# Patient Record
Sex: FEMALE | Race: WHITE | NOT HISPANIC OR LATINO | Employment: OTHER | URBAN - METROPOLITAN AREA
[De-identification: names, ages, dates, MRNs, and addresses within clinical notes are randomized per-mention and may not be internally consistent; named-entity substitution may affect disease eponyms.]

---

## 2017-02-10 ENCOUNTER — TELEPHONE (OUTPATIENT)
Dept: FAMILY MEDICINE | Facility: CLINIC | Age: 72
End: 2017-02-10

## 2017-02-10 NOTE — TELEPHONE ENCOUNTER
----- Message from Babita Poe sent at 2/10/2017 12:08 PM CST -----  Contact: self  Patient called regarding refill of medication. Please contact [phone] (home)     pravastatin 40 MG tablet  losartan 50 MG tablet    [facility] - [place], [place] - [address]  [address] [address]  Phone: [phone] Fax: [phone]

## 2017-02-11 RX ORDER — PRAVASTATIN SODIUM 40 MG/1
TABLET ORAL
Qty: 90 TABLET | Refills: 0 | Status: SHIPPED | OUTPATIENT
Start: 2017-02-11 | End: 2017-05-10 | Stop reason: SDUPTHER

## 2017-02-11 RX ORDER — LOSARTAN POTASSIUM 50 MG/1
50 TABLET ORAL DAILY
Qty: 90 TABLET | Refills: 0 | Status: SHIPPED | OUTPATIENT
Start: 2017-02-11 | End: 2017-04-11 | Stop reason: SDUPTHER

## 2017-04-14 RX ORDER — LOSARTAN POTASSIUM 50 MG/1
TABLET ORAL
Qty: 90 TABLET | Refills: 0 | Status: SHIPPED | OUTPATIENT
Start: 2017-04-14 | End: 2017-04-18 | Stop reason: SDUPTHER

## 2017-04-19 NOTE — TELEPHONE ENCOUNTER
----- Message from Cady Jarvis sent at 4/18/2017  4:50 PM CDT -----  Contact: Patient  Honey, patient [phone], Patient is calling about the Rx Lorsartan 50 mg. Please call into Pharmacy. Patient is schedule to visit office on 7/10/17.    [facility] - [place], [place] - [address]  [address] [address]  Phone: [phone] Fax: [phone]    Please advise. Thanks.

## 2017-04-21 RX ORDER — LOSARTAN POTASSIUM 50 MG/1
TABLET ORAL
Qty: 90 TABLET | Refills: 0 | Status: SHIPPED | OUTPATIENT
Start: 2017-04-21 | End: 2017-07-10 | Stop reason: SDUPTHER

## 2017-05-11 RX ORDER — PRAVASTATIN SODIUM 40 MG/1
TABLET ORAL
Qty: 90 TABLET | Refills: 0 | Status: SHIPPED | OUTPATIENT
Start: 2017-05-11 | End: 2017-07-10

## 2017-06-08 DIAGNOSIS — M25.569 KNEE PAIN, UNSPECIFIED CHRONICITY, UNSPECIFIED LATERALITY: Primary | ICD-10-CM

## 2017-06-13 ENCOUNTER — HOSPITAL ENCOUNTER (OUTPATIENT)
Dept: RADIOLOGY | Facility: HOSPITAL | Age: 72
Discharge: HOME | End: 2017-06-13
Attending: ORTHOPAEDIC SURGERY
Payer: MEDICARE

## 2017-06-13 ENCOUNTER — OFFICE VISIT (OUTPATIENT)
Dept: ORTHOPEDICS | Facility: CLINIC | Age: 72
End: 2017-06-13
Payer: MEDICARE

## 2017-06-13 VITALS
SYSTOLIC BLOOD PRESSURE: 116 MMHG | DIASTOLIC BLOOD PRESSURE: 76 MMHG | HEART RATE: 87 BPM | WEIGHT: 121.94 LBS | HEIGHT: 63 IN | BODY MASS INDEX: 21.61 KG/M2

## 2017-06-13 DIAGNOSIS — M21.161 GENU VARUM OF BOTH LOWER EXTREMITIES: ICD-10-CM

## 2017-06-13 DIAGNOSIS — M17.0 PRIMARY OSTEOARTHRITIS OF BOTH KNEES: Primary | ICD-10-CM

## 2017-06-13 DIAGNOSIS — M25.562 CHRONIC PAIN OF BOTH KNEES: ICD-10-CM

## 2017-06-13 DIAGNOSIS — M21.162 GENU VARUM OF BOTH LOWER EXTREMITIES: ICD-10-CM

## 2017-06-13 DIAGNOSIS — M25.569 KNEE PAIN, UNSPECIFIED CHRONICITY, UNSPECIFIED LATERALITY: ICD-10-CM

## 2017-06-13 DIAGNOSIS — G89.29 CHRONIC PAIN OF BOTH KNEES: ICD-10-CM

## 2017-06-13 DIAGNOSIS — M25.561 CHRONIC PAIN OF BOTH KNEES: ICD-10-CM

## 2017-06-13 PROCEDURE — 1125F AMNT PAIN NOTED PAIN PRSNT: CPT | Mod: S$GLB,,, | Performed by: ORTHOPAEDIC SURGERY

## 2017-06-13 PROCEDURE — 99213 OFFICE O/P EST LOW 20 MIN: CPT | Mod: 25,S$GLB,, | Performed by: ORTHOPAEDIC SURGERY

## 2017-06-13 PROCEDURE — 99999 PR PBB SHADOW E&M-EST. PATIENT-LVL II: CPT | Mod: PBBFAC,,, | Performed by: ORTHOPAEDIC SURGERY

## 2017-06-13 PROCEDURE — 73564 X-RAY EXAM KNEE 4 OR MORE: CPT | Mod: TC,50,PO

## 2017-06-13 PROCEDURE — 73564 X-RAY EXAM KNEE 4 OR MORE: CPT | Mod: 26,50,, | Performed by: RADIOLOGY

## 2017-06-13 PROCEDURE — 97760 ORTHOTIC MGMT&TRAING 1ST ENC: CPT | Mod: 51,GP,S$GLB, | Performed by: ORTHOPAEDIC SURGERY

## 2017-06-13 PROCEDURE — 20610 DRAIN/INJ JOINT/BURSA W/O US: CPT | Mod: 50,S$GLB,, | Performed by: ORTHOPAEDIC SURGERY

## 2017-06-13 PROCEDURE — 1159F MED LIST DOCD IN RCRD: CPT | Mod: S$GLB,,, | Performed by: ORTHOPAEDIC SURGERY

## 2017-06-13 RX ORDER — TRIAMCINOLONE ACETONIDE 40 MG/ML
80 INJECTION, SUSPENSION INTRA-ARTICULAR; INTRAMUSCULAR
Status: DISCONTINUED | OUTPATIENT
Start: 2017-06-13 | End: 2017-06-13 | Stop reason: HOSPADM

## 2017-06-13 RX ADMIN — TRIAMCINOLONE ACETONIDE 80 MG: 40 INJECTION, SUSPENSION INTRA-ARTICULAR; INTRAMUSCULAR at 08:06

## 2017-06-13 NOTE — PROGRESS NOTES
CC:bilateral KNEE pain    HISTORY OF PRESENT ILLNESS:  Honey Alejandre is a 71 y.o. left hand dominant Female who reports bilateral (left > right) knee pain refractory to conservative mgmt. She did have injections in December 2016.    History isnegative fortrauma.    Today the patient rates pain at a 7/10 on visual analog scale.      The patient had tried diclofenac once a day; injections (left knee steroid/viscosupplements) to make the pain better with some relief of the pain.    She reports that the pain is worse with standing; walking; running.  It does currently bother the patient at night.    negative mechanical symptoms, negative forinstability; +/- swelling on the left primarily    It does currently affect the performance of ADLs. It does currently affect the ability to perform exercises or recreational activities which include: walking      Past Medical History:   Diagnosis Date    CAD (coronary artery disease)     lad, 2008    Cataract     OU    Colon polyps     Foot fracture     bilateral    Hyperlipidemia     Hypertension     Hyperthyroidism     in past in childhood    Lateral meniscal tear     Seasonal allergies        Past Surgical History:   Procedure Laterality Date    CAROTID STENT      COLONOSCOPY W/ POLYPECTOMY  8/28/2007  [facility]    3 mm transverse colon polyp.  ADENOMATOUS POLYP.  Diminutive sigmoid polyp and rectal polyp, both SERRATED POLYPS WITH HYPERPLASTIC FEATURES.  Extensive sigmoid and descending diverticulosis.  Internal hemorrhoids.    DILATION AND CURETTAGE OF UTERUS      EYE SURGERY      left, after trauma (facial)    FLEXIBLE SIGMOIDOSCOPY  1987  [facility]    HERNIA REPAIR      right inguinal    s/p orbital fracture repair --OS      TONSILLECTOMY      and adenoids    UMBILICAL HERNIA REPAIR         Family History   Problem Relation Age of Onset    Colon cancer Mother     Thyroid disease Mother     Cancer Mother      colon    Stroke Father     Coronary artery  disease Father      1st MI at 52 years old    Lung cancer Sister      oat cell    Breast cancer Sister     Arthritis Brother     Cancer Sister      lung cancer (Oat Cell carcinoma)    Breast cancer       pat GM, sister, cousin         Current Outpatient Prescriptions:     azelastine (ASTELIN) 137 mcg nasal spray, 1 spray (137 mcg total) by Nasal route 2 (two) times daily. (Patient taking differently: 1 spray by Nasal route 2 (two) times daily as needed. ), Disp: 30 mL, Rfl: 1    cholecalciferol, vitamin D3, (VITAMIN D) 1,000 unit capsule, Take 1,000 Units by mouth once daily. Every day, Disp: , Rfl:     cyanocobalamin (VITAMIN B-12) 1000 MCG tablet, Every morning, Disp: , Rfl:     diclofenac (VOLTAREN) 75 MG EC tablet, TAKE 1 TABLET TWICE DAILY, Disp: 180 tablet, Rfl: 3    krill oil-omega-3-dha-epa 300-90 (27-45) mg Cap, Every morning, Disp: , Rfl:     losartan (COZAAR) 50 MG tablet, TAKE 1 TABLET ONE TIME DAILY, Disp: 90 tablet, Rfl: 0    multivitamin-minerals-lutein (SPECTRAVITE SENIOR W-LYCOPENE) Tab, Every morning, Disp: , Rfl:     pravastatin (PRAVACHOL) 40 MG tablet, TAKE 1 TABLET EVERY DAY, Disp: 90 tablet, Rfl: 0    Current Facility-Administered Medications:     triamcinolone acetonide injection 40 mg, 40 mg, Intramuscular, 1 time in Clinic/HOD, Dillon Eckert Jr., MD    Allergies   Allergen Reactions    Morphine      Other reaction(s): Nausea       Review of Systems   Constitutional: Positive for weight loss.   HENT: Positive for congestion and hearing loss.    Eyes:        Change in vision; left ey stroke   Cardiovascular:        HTN   Gastrointestinal: Positive for diarrhea.   Musculoskeletal: Positive for joint pain and myalgias.        Stiffness   Skin: Positive for rash.   Neurological: Positive for headaches.   Endo/Heme/Allergies:        Cholesterol problem   All other systems reviewed and are negative.      OBJECTIVE:  Vitals:    06/13/17 0813   BP: 116/76   Pulse: 87       PHYSICAL  EXAMINATION    General:  The patient is alert and oriented x 3.  Mood is pleasant.  Observation of ears, eyes and nose reveal no gross abnormalities.  No labored breathing observed.    Bilateral KNEE EXAMINATION     OBSERVATION / INSPECTION   Gait:   Antalgic   Alignment:  Varus bilaterally  Scars:   None   Muscle atrophy: Mild  Effusion:  Mild bilaterally   Warmth:  None   Discoloration:   none     TENDERNESS / CREPITUS (T / C):          T / C      T / C   Patella   ++ / -   Lateral joint line   - / -    Peripatellar medial  +  Medial joint line    +++ / -    Peripatellar lateral +  Medial plica   - / -    Patellar tendon -   Popliteal fossa  - / -    Quad tendon   -   Gastrocnemius   -   Prepatellar Bursa - / -   Quadricep   -   Tibial tubercle  -  Thigh/hamstring  -   Pes anserine/HS -  Fibula    -   ITB   - / -  Tibia     -   Tib/fib joint  - / -  LCL    -     MFC   - / -   MCL: Proximal  -    LFC   - / -    Distal   -          ROM: (* = pain)  PASSIVE   ACTIVE    Left :   5 / 130    5  / 130  *   Right :    0 / 140    0 / 140  *    Patellofemoral examination:  See above noted areas of tenderness.   Patella position    Subluxation / dislocation: Centered           Sup. / Inf;   Normal   Crepitus (PF):    ++ bilaterally  Patellar Mobility:       Medial-lateral:   Normal    Superior-inferior:  Normal    Inferior tilt   Normal    Patellar tendon:  Normal   Lateral tilt:    Normal   J-sign:     None   Patellofemoral grind:   +       MENISCAL SIGNS:     Pain on terminal extension:  -  Pain on terminal flexion:  +  Renes maneuver:  - for pain      LIGAMENT EXAMINATION:  ACL / Lachman:  normal (-1 to 2mm)    PCL-Post.  drawer: normal 0 to 2mm  MCL- Valgus:  normal 0 to 2mm  LCL- Varus:  normal 0 to 2mm    STRENGTH: (* = with pain) PAINFUL SIDE   Quadricep   5/5   Hamstrin/5    EXTREMITY NEURO-VASCULAR EXAMINATION:   Sensation:  Grossly intact to light touch all dermatomal regions.   Motor Function:   Fully intact motor function at hip, knee, foot and ankle    Vascular status:  DP and PT pulses 2+, brisk capillary refill, symmetric.       Xrays: Reviewed personally by me (standing AP/flexion, lateral, sunrise) show:   Findings:     The bones are osteopenic.  There is tricompartmental osteophyte formation of both knees.  There is severe bilateral medial tibiofemoral joint space narrowing resulting in varus angulation of both knees.  No acute fracture or osseous destructive process.  No chondrocalcinosis.   Impression    Degenerative change of the knees, most prominently affecting the medial tibiofemoral joint compartments.            ASSESSMENT:    Bilateral Knee Osteoarthritis with exacerbations    PLAN:   The patient was counseled today regarding her diagnostic study results and the different treatment options. I spent >50% of the time discussing conservative vs. Operative options with the patient as well as risks and benefits of the different options.  Bilateral cortisone injections  [id number] - Martha Gold and I, performed a custom orthotic / brace adjustment, fitting and training with the patient. The patient demonstrated understanding and proper care. This was performed for 15 minutes.  Knee sleeve  F/U PRN  All questions were answered, pt will contact us for questions or concerns in the interim.

## 2017-06-13 NOTE — PROCEDURES
Large Joint Aspiration/Injection  Date/Time: 6/13/2017 8:42 AM  Performed by: NI COLÓN  Authorized by: NI COLÓN     Consent Done?:  Yes (Verbal)  Indications:  Pain  Procedure site marked: Yes    Timeout: Prior to procedure the correct patient, procedure, and site was verified      Location:  Knee  Site:  R knee and L knee  Prep: Patient was prepped and draped in usual sterile fashion    Ultrasonic Guidance for needle placement: No  Needle size:  22 G  Approach:  Anterolateral  Medications:  80 mg triamcinolone acetonide 40 mg/mL; 80 mg triamcinolone acetonide 40 mg/mL  Patient tolerance:  Patient tolerated the procedure well with no immediate complications

## 2017-07-09 NOTE — PROGRESS NOTES
HISTORY OF PRESENT ILLNESS:  PtJose is a 71 y.o. female presents for monitoring of her hypertension, hyperlipidemia, coronary artery disease, anemia, OA of the knees.  She is UTD with Tdap 7/19/16, Prevnar 13, colonoscopy done 7/24/13.  She is due for mammogram, BMD, pneumovax.  She recently saw Dr. Kumar for her OA of knees and had joint injections.  She had labs done 6 months ago that were WNL.  Has been having increased bruising.    Lab Results   Component Value Date    WBC 4.65 12/13/2016    HGB 13.4 12/13/2016    HCT 41.1 12/13/2016     12/13/2016    CHOL 212 (H) 12/13/2016    TRIG 107 12/13/2016    HDL 53 12/13/2016    ALT 18 12/13/2016    AST 15 12/13/2016     12/13/2016    K 4.3 12/13/2016     12/13/2016    CREATININE 0.8 12/13/2016    BUN 25 (H) 12/13/2016    CO2 26 12/13/2016    TSH 1.491 12/13/2016    HGBA1C 5.8 07/27/2012     Lab Results   Component Value Date    LDLCALC 137.6 12/13/2016           ROS:  GENERAL: No fever, chills, fatigability or weight loss.  SKIN: No rashes, itching or changes in color or texture of skin; positive bruising;  HEAD: No headaches or recent head trauma.  EARS: Denies ear pain, discharge or vertigo; slight dizziness if turning head quickly;  NOSE: No loss of smell, no epistaxis or postnasal drip.  MOUTH & THROAT: No hoarseness or change in voice. No excessive gum bleeding.  NODES: Denies swollen glands.  CHEST: Denies BRUNO, cyanosis, wheezing, cough and sputum production.  CARDIOVASCULAR: Denies chest pain, PND, orthopnea or reduced exercise tolerance.  ABDOMEN: Appetite fine. No weight loss. Denies constipation, diarrhea, abdominal pain, hematemesis or blood in stool.  URINARY: No flank pain, dysuria or hematuria.  PERIPHERAL VASCULAR: No claudication or cyanosis. No edema.  MUSCULOSKELETAL: No joint stiffness or swelling. Denies back pain.  NEUROLOGIC: Denies numbness    PE:   Vitals:   Vitals:    07/10/17 1455   BP: 130/78   Pulse: 81     GENERAL: no  acute distress, A&Ox3, comfortable.  Female with BMI of 21   HEENT: tympanic membranes clear, nasal mucosa pink, no pharyngeal erythema or exudate  NECK: supple, no cervical lymphadenopathy, no thyromegaly; no supraclavicular nodes;   CHEST:  clear to auscultation bilaterally, no crackles or wheeze; no increased work of breathing;  CARDIOVASCULAR: regular rate and rhythm, no rubs, murmurs or gallops.  ABDOMEN: normal bowel sounds, soft non-tender, non-distended; no palpable organomegaly;   EXT: no clubbing, cyanosis or edema.     ASSESSMENT/PLAN:    Essential hypertension  -     Comprehensive metabolic panel; Future; Expected date: 07/10/2017    Bruising  -     CBC auto differential; Future; Expected date: 07/10/2017    Hyperlipidemia, unspecified hyperlipidemia type  -     Lipid panel; Future; Expected date: 12/18/2017    Health care maintenance  -     Mammo Digital Screening Bilat with CAD; Future; Expected date: 07/10/2017  -     DXA Bone Density Spine And Hip; Future; Expected date: 07/10/2017    Encounter for screening mammogram for malignant neoplasm of breast   -     Mammo Digital Screening Bilat with CAD; Future; Expected date: 07/10/2017    Asymptomatic menopausal state   -     DXA Bone Density Spine And Hip; Future; Expected date: 07/10/2017    Other orders  -     (In Office Administered) Pneumococcal Polysaccharide Vaccine (23 Valent) (SQ/IM)  -     pravastatin (PRAVACHOL) 40 MG tablet; Take 1 tablet (40 mg total) by mouth once daily.  Dispense: 90 tablet; Refill: 0  -     losartan (COZAAR) 50 MG tablet; TAKE 1 TABLET ONE TIME DAILY  Dispense: 90 tablet; Refill: 0      Call if condition changes or worsens.

## 2017-07-10 ENCOUNTER — OFFICE VISIT (OUTPATIENT)
Dept: INTERNAL MEDICINE | Facility: CLINIC | Age: 72
End: 2017-07-10
Payer: MEDICARE

## 2017-07-10 VITALS
OXYGEN SATURATION: 98 % | HEIGHT: 63 IN | SYSTOLIC BLOOD PRESSURE: 130 MMHG | BODY MASS INDEX: 21.05 KG/M2 | DIASTOLIC BLOOD PRESSURE: 78 MMHG | WEIGHT: 118.81 LBS | HEART RATE: 81 BPM

## 2017-07-10 DIAGNOSIS — E78.5 HYPERLIPIDEMIA, UNSPECIFIED HYPERLIPIDEMIA TYPE: ICD-10-CM

## 2017-07-10 DIAGNOSIS — I10 ESSENTIAL HYPERTENSION: ICD-10-CM

## 2017-07-10 DIAGNOSIS — Z00.00 HEALTH CARE MAINTENANCE: ICD-10-CM

## 2017-07-10 DIAGNOSIS — Z78.0 ASYMPTOMATIC MENOPAUSAL STATE: ICD-10-CM

## 2017-07-10 DIAGNOSIS — Z12.31 ENCOUNTER FOR SCREENING MAMMOGRAM FOR MALIGNANT NEOPLASM OF BREAST: ICD-10-CM

## 2017-07-10 DIAGNOSIS — T14.8XXA BRUISING: ICD-10-CM

## 2017-07-10 PROCEDURE — 1125F AMNT PAIN NOTED PAIN PRSNT: CPT | Mod: S$GLB,,, | Performed by: INTERNAL MEDICINE

## 2017-07-10 PROCEDURE — 99999 PR PBB SHADOW E&M-EST. PATIENT-LVL III: CPT | Mod: PBBFAC,,, | Performed by: INTERNAL MEDICINE

## 2017-07-10 PROCEDURE — G0009 ADMIN PNEUMOCOCCAL VACCINE: HCPCS | Mod: S$GLB,,, | Performed by: INTERNAL MEDICINE

## 2017-07-10 PROCEDURE — 90732 PPSV23 VACC 2 YRS+ SUBQ/IM: CPT | Mod: S$GLB,,, | Performed by: INTERNAL MEDICINE

## 2017-07-10 PROCEDURE — 1159F MED LIST DOCD IN RCRD: CPT | Mod: S$GLB,,, | Performed by: INTERNAL MEDICINE

## 2017-07-10 PROCEDURE — 99214 OFFICE O/P EST MOD 30 MIN: CPT | Mod: S$GLB,,, | Performed by: INTERNAL MEDICINE

## 2017-07-10 RX ORDER — LOSARTAN POTASSIUM 50 MG/1
TABLET ORAL
Qty: 90 TABLET | Refills: 0 | Status: SHIPPED | OUTPATIENT
Start: 2017-07-10 | End: 2017-10-13 | Stop reason: SDUPTHER

## 2017-07-10 RX ORDER — B-COMPLEX WITH VITAMIN C
1 TABLET ORAL DAILY
COMMUNITY
End: 2018-04-30

## 2017-07-10 RX ORDER — GLUCOSAMINE/CHONDRO SU A 500-400 MG
1 TABLET ORAL 3 TIMES DAILY
COMMUNITY
End: 2017-11-07

## 2017-07-10 RX ORDER — PRAVASTATIN SODIUM 40 MG/1
40 TABLET ORAL DAILY
Qty: 90 TABLET | Refills: 0
Start: 2017-07-10 | End: 2017-07-19 | Stop reason: SDUPTHER

## 2017-07-11 ENCOUNTER — TELEPHONE (OUTPATIENT)
Dept: ORTHOPEDICS | Facility: CLINIC | Age: 72
End: 2017-07-11

## 2017-07-11 NOTE — TELEPHONE ENCOUNTER
----- Message from Betito Kumar MD sent at 7/11/2017 12:58 PM CDT -----      ----- Message -----  From: Mickie Lynch  Sent: 7/11/2017  11:01 AM  To: Betito Kumar MD    Good Morning,     I received a call from Mrs. DHRUV Maldonado requesting the total cost estimate for knee replacement surgery. I am happy to assist but am needing the CPT Code of this surgery and also need to know this is done as Inpatient or out patient. Can you please provide this information so that I may complete the pt's request? I appreciate any help you can give.    Thank you,     Mickie

## 2017-07-12 PROBLEM — T14.8XXA BRUISING: Status: ACTIVE | Noted: 2017-07-12

## 2017-07-18 ENCOUNTER — HOSPITAL ENCOUNTER (OUTPATIENT)
Dept: RADIOLOGY | Facility: HOSPITAL | Age: 72
Discharge: HOME | End: 2017-07-18
Attending: INTERNAL MEDICINE
Payer: MEDICARE

## 2017-07-18 DIAGNOSIS — Z12.31 ENCOUNTER FOR SCREENING MAMMOGRAM FOR MALIGNANT NEOPLASM OF BREAST: ICD-10-CM

## 2017-07-18 DIAGNOSIS — Z00.00 HEALTH CARE MAINTENANCE: ICD-10-CM

## 2017-07-18 PROCEDURE — 77067 SCR MAMMO BI INCL CAD: CPT | Mod: 26,,, | Performed by: RADIOLOGY

## 2017-07-18 PROCEDURE — 77067 SCR MAMMO BI INCL CAD: CPT | Mod: TC

## 2017-07-18 PROCEDURE — 77063 BREAST TOMOSYNTHESIS BI: CPT | Mod: 26,,, | Performed by: RADIOLOGY

## 2017-07-19 ENCOUNTER — HOSPITAL ENCOUNTER (OUTPATIENT)
Dept: RADIOLOGY | Facility: HOSPITAL | Age: 72
Discharge: HOME | End: 2017-07-19
Attending: INTERNAL MEDICINE
Payer: MEDICARE

## 2017-07-19 DIAGNOSIS — Z78.0 ASYMPTOMATIC MENOPAUSAL STATE: ICD-10-CM

## 2017-07-19 DIAGNOSIS — Z00.00 HEALTH CARE MAINTENANCE: ICD-10-CM

## 2017-07-19 PROCEDURE — 77080 DXA BONE DENSITY AXIAL: CPT | Mod: TC,PO

## 2017-07-19 PROCEDURE — 77080 DXA BONE DENSITY AXIAL: CPT | Mod: 26,,, | Performed by: RADIOLOGY

## 2017-07-19 RX ORDER — PRAVASTATIN SODIUM 40 MG/1
TABLET ORAL
Qty: 90 TABLET | Refills: 0 | Status: SHIPPED | OUTPATIENT
Start: 2017-07-19 | End: 2017-09-22 | Stop reason: SDUPTHER

## 2017-07-25 ENCOUNTER — OFFICE VISIT (OUTPATIENT)
Dept: ORTHOPEDICS | Facility: CLINIC | Age: 72
End: 2017-07-25
Payer: MEDICARE

## 2017-07-25 ENCOUNTER — CLINICAL SUPPORT (OUTPATIENT)
Dept: ORTHOPEDICS | Facility: CLINIC | Age: 72
End: 2017-07-25
Payer: MEDICARE

## 2017-07-25 VITALS
HEART RATE: 75 BPM | DIASTOLIC BLOOD PRESSURE: 80 MMHG | WEIGHT: 118.81 LBS | BODY MASS INDEX: 21.05 KG/M2 | SYSTOLIC BLOOD PRESSURE: 159 MMHG | HEIGHT: 63 IN

## 2017-07-25 DIAGNOSIS — M21.162 GENU VARUM OF BOTH LOWER EXTREMITIES: ICD-10-CM

## 2017-07-25 DIAGNOSIS — M25.562 CHRONIC PAIN OF BOTH KNEES: ICD-10-CM

## 2017-07-25 DIAGNOSIS — M21.161 GENU VARUM OF BOTH LOWER EXTREMITIES: ICD-10-CM

## 2017-07-25 DIAGNOSIS — G89.18 POST-OP PAIN: ICD-10-CM

## 2017-07-25 DIAGNOSIS — Z96.652 S/P TOTAL KNEE ARTHROPLASTY, LEFT: Primary | ICD-10-CM

## 2017-07-25 DIAGNOSIS — M25.561 CHRONIC PAIN OF BOTH KNEES: ICD-10-CM

## 2017-07-25 DIAGNOSIS — G89.29 CHRONIC PAIN OF BOTH KNEES: ICD-10-CM

## 2017-07-25 DIAGNOSIS — M17.0 PRIMARY OSTEOARTHRITIS OF BOTH KNEES: ICD-10-CM

## 2017-07-25 PROCEDURE — 99214 OFFICE O/P EST MOD 30 MIN: CPT | Mod: 57,S$GLB,, | Performed by: ORTHOPAEDIC SURGERY

## 2017-07-25 PROCEDURE — 99999 PR PBB SHADOW E&M-EST. PATIENT-LVL IV: CPT | Mod: PBBFAC,,, | Performed by: ORTHOPAEDIC SURGERY

## 2017-07-25 PROCEDURE — 1126F AMNT PAIN NOTED NONE PRSNT: CPT | Mod: S$GLB,,, | Performed by: ORTHOPAEDIC SURGERY

## 2017-07-25 PROCEDURE — 1159F MED LIST DOCD IN RCRD: CPT | Mod: S$GLB,,, | Performed by: ORTHOPAEDIC SURGERY

## 2017-07-25 RX ORDER — PROMETHAZINE HYDROCHLORIDE 25 MG/1
25 TABLET ORAL EVERY 6 HOURS PRN
Qty: 20 TABLET | Refills: 0 | Status: SHIPPED | OUTPATIENT
Start: 2017-07-25 | End: 2017-11-07

## 2017-07-25 RX ORDER — OXYCODONE AND ACETAMINOPHEN 5; 325 MG/1; MG/1
1 TABLET ORAL EVERY 8 HOURS PRN
Qty: 90 TABLET | Refills: 0 | Status: ON HOLD | OUTPATIENT
Start: 2017-07-25 | End: 2017-09-15 | Stop reason: HOSPADM

## 2017-07-25 RX ORDER — DOCUSATE SODIUM 100 MG/1
100 CAPSULE, LIQUID FILLED ORAL 2 TIMES DAILY
Qty: 60 CAPSULE | Refills: 0 | Status: SHIPPED | OUTPATIENT
Start: 2017-07-25 | End: 2017-11-07

## 2017-07-25 RX ORDER — SODIUM CHLORIDE 9 MG/ML
INJECTION, SOLUTION INTRAVENOUS CONTINUOUS
Status: CANCELLED | OUTPATIENT
Start: 2017-07-25

## 2017-07-25 NOTE — PROGRESS NOTES
CC:bilateral KNEE pain    HISTORY OF PRESENT ILLNESS:  Honey Alejandre is a 71 y.o. left hand dominant Female who reports bilateral (left > right) knee pain refractory to conservative mgmt. She did have injections in December 2016. She and her family are here to discuss total knee replacement    History isnegative fortrauma.    Today the patient rates pain at a 0-7/10 on visual analog scale.      The patient had tried diclofenac once a day; injections (left knee steroid/viscosupplements) to make the pain better with some relief of the pain.    She reports that the pain is worse with standing; walking; running.  It does currently bother the patient at night.    negative mechanical symptoms, negative forinstability; +/- swelling on the left primarily    It does currently affect the performance of ADLs. It does currently affect the ability to perform exercises or recreational activities which include: walking      Past Medical History:   Diagnosis Date    Arthritis     CAD (coronary artery disease)     lad, 2008    Cataract     OU    Colon polyps     Foot fracture     bilateral    Hyperlipidemia     Hypertension     Hyperthyroidism     in past in childhood    Lateral meniscal tear     Seasonal allergies        Past Surgical History:   Procedure Laterality Date    CAROTID STENT      COLONOSCOPY W/ POLYPECTOMY  8/28/2007  [facility]    3 mm transverse colon polyp.  ADENOMATOUS POLYP.  Diminutive sigmoid polyp and rectal polyp, both SERRATED POLYPS WITH HYPERPLASTIC FEATURES.  Extensive sigmoid and descending diverticulosis.  Internal hemorrhoids.    DILATION AND CURETTAGE OF UTERUS      EYE SURGERY      left, after trauma (facial)    FLEXIBLE SIGMOIDOSCOPY  1987  [facility]    HERNIA REPAIR      right inguinal    s/p orbital fracture repair --OS      TONSILLECTOMY      and adenoids    UMBILICAL HERNIA REPAIR         Family History   Problem Relation Age of Onset    Colon cancer Mother     Thyroid disease  Mother     Cancer Mother      colon    Stroke Father     Coronary artery disease Father      1st MI at 52 years old    Lung cancer Sister      oat cell    Breast cancer Sister     Arthritis Brother     Cancer Sister      lung cancer (Oat Cell carcinoma)    Breast cancer       pat GM, sister, cousin         Current Outpatient Prescriptions:     B-complex with vitamin C (Z-BEC OR EQUIV) tablet, Take 1 tablet by mouth once daily., Disp: , Rfl:     cholecalciferol, vitamin D3, (VITAMIN D) 1,000 unit capsule, Take 1,000 Units by mouth once daily. Every day, Disp: , Rfl:     diclofenac (VOLTAREN) 75 MG EC tablet, TAKE 1 TABLET TWICE DAILY, Disp: 180 tablet, Rfl: 3    glucosamine-chondroitin 500-400 mg tablet, Take by mouth 3 (three) times daily., Disp: , Rfl:     krill oil-omega-3-dha-epa 300-90 (27-45) mg Cap, Every morning, Disp: , Rfl:     losartan (COZAAR) 50 MG tablet, TAKE 1 TABLET ONE TIME DAILY, Disp: 90 tablet, Rfl: 0    multivitamin-minerals-lutein (SPECTRAVITE SENIOR W-LYCOPENE) Tab, Every morning, Disp: , Rfl:     pravastatin (PRAVACHOL) 40 MG tablet, TAKE 1 TABLET EVERY DAY, Disp: 90 tablet, Rfl: 0    docusate sodium (COLACE) 100 MG capsule, Take 1 capsule (100 mg total) by mouth 2 (two) times daily., Disp: 60 capsule, Rfl: 0    oxycodone-acetaminophen (PERCOCET) 5-325 mg per tablet, Take 1 tablet by mouth every 8 (eight) hours as needed for Pain., Disp: 90 tablet, Rfl: 0    promethazine (PHENERGAN) 25 MG tablet, Take 1 tablet (25 mg total) by mouth every 6 (six) hours as needed for Nausea., Disp: 20 tablet, Rfl: 0    Current Facility-Administered Medications:     triamcinolone acetonide injection 40 mg, 40 mg, Intramuscular, 1 time in Clinic/HOD, Dillon Eckert Jr., MD    Allergies   Allergen Reactions    Morphine      Other reaction(s): Nausea       Review of Systems   Constitutional: Positive for weight loss.   HENT: Positive for congestion and hearing loss.    Eyes:        Change in  vision; left ey stroke   Cardiovascular:        HTN   Gastrointestinal: Positive for diarrhea.   Musculoskeletal: Positive for joint pain and myalgias.        Stiffness   Skin: Positive for rash.   Neurological: Positive for headaches.   Endo/Heme/Allergies:        Cholesterol problem   All other systems reviewed and are negative.      OBJECTIVE:  Vitals:    07/25/17 0837   BP: (!) 159/80   Pulse: 75       PHYSICAL EXAMINATION    General:  The patient is alert and oriented x 3.  Mood is pleasant.  Observation of ears, eyes and nose reveal no gross abnormalities.  No labored breathing observed.    Bilateral KNEE EXAMINATION     OBSERVATION / INSPECTION   Gait:   Antalgic   Alignment:  Varus bilaterally  Scars:   None   Muscle atrophy: Mild  Effusion:  Mild bilaterally   Warmth:  None   Discoloration:   none     TENDERNESS / CREPITUS (T / C):          T / C      T / C   Patella   ++ / -   Lateral joint line   - / -    Peripatellar medial  +  Medial joint line    +++ / -    Peripatellar lateral +  Medial plica   - / -    Patellar tendon -   Popliteal fossa  - / -    Quad tendon   -   Gastrocnemius   -   Prepatellar Bursa - / -   Quadricep   -   Tibial tubercle  -  Thigh/hamstring  -   Pes anserine/HS -  Fibula    -   ITB   - / -  Tibia     -   Tib/fib joint  - / -  LCL    -     MFC   - / -   MCL: Proximal  -    LFC   - / -    Distal   -          ROM: (* = pain)  PASSIVE   ACTIVE    Left :   5 / 130    5  / 130  *   Right :    0 / 140    0 / 140  *    Patellofemoral examination:  See above noted areas of tenderness.   Patella position    Subluxation / dislocation: Centered           Sup. / Inf;   Normal   Crepitus (PF):    ++ bilaterally  Patellar Mobility:       Medial-lateral:   Normal    Superior-inferior:  Normal    Inferior tilt   Normal    Patellar tendon:  Normal   Lateral tilt:    Normal   J-sign:     None   Patellofemoral grind:   +       MENISCAL SIGNS:     Pain on terminal extension:  -  Pain on terminal  flexion:  +  Renes maneuver:  - for pain      LIGAMENT EXAMINATION:  ACL / Lachman:  normal (-1 to 2mm)    PCL-Post.  drawer: normal 0 to 2mm  MCL- Valgus:  normal 0 to 2mm  LCL- Varus:  normal 0 to 2mm    STRENGTH: (* = with pain) PAINFUL SIDE   Quadricep   5/5   Hamstrin/5    EXTREMITY NEURO-VASCULAR EXAMINATION:   Sensation:  Grossly intact to light touch all dermatomal regions.   Motor Function:  Fully intact motor function at hip, knee, foot and ankle    Vascular status:  DP and PT pulses 2+, brisk capillary refill, symmetric.       Xrays: Reviewed personally by me (standing AP/flexion, lateral, sunrise) show:   Findings:     The bones are osteopenic.  There is tricompartmental osteophyte formation of both knees.  There is severe bilateral medial tibiofemoral joint space narrowing resulting in varus angulation of both knees.  No acute fracture or osseous destructive process.  No chondrocalcinosis.   Impression    Degenerative change of the knees, most prominently affecting the medial tibiofemoral joint compartments.            ASSESSMENT:    Bilateral Knee Osteoarthritis with exacerbations    PLAN:   We reviewed with Honey today, the pathology and natural history of her diagnosis. We have discussed a variety of treatment options including medications, physical therapy and other alternative treatments. I also explained the indications, risks and benefits of surgery. After discussion, Honey decided to proceed with surgery. The decision was made to go forward with     Left TKA ([facility]uy)  2017 ([facility])    The details of the surgical procedure were explained, including the location of probable incisions and a description of likely hardware and/or grafts to be used.  The patient understands the likely convalescence after surgery.  Also, we have thoroughly discussed the risks, benefits and alternatives to surgery, including, but not limited to, the risk of infection, joint stiffness, blood  clot (including DVT and/or pulmonary embolus), neurologic and vascular injury.  It was explained that, if tissue has been repaired or reconstructed, there is a chance of failure, which may require further management.      All of the patient's questions were answered and informed consent was obtained. The patient will contact us if they have any questions or concerns in the interim.

## 2017-08-01 ENCOUNTER — OFFICE VISIT (OUTPATIENT)
Dept: INTERNAL MEDICINE | Facility: CLINIC | Age: 72
End: 2017-08-01
Payer: MEDICARE

## 2017-08-01 ENCOUNTER — PATIENT MESSAGE (OUTPATIENT)
Dept: INTERNAL MEDICINE | Facility: CLINIC | Age: 72
End: 2017-08-01

## 2017-08-01 ENCOUNTER — CLINICAL SUPPORT (OUTPATIENT)
Dept: CARDIOLOGY | Facility: CLINIC | Age: 72
End: 2017-08-01
Payer: MEDICARE

## 2017-08-01 ENCOUNTER — HOSPITAL ENCOUNTER (OUTPATIENT)
Dept: RADIOLOGY | Facility: HOSPITAL | Age: 72
Discharge: HOME | End: 2017-08-01
Attending: INTERNAL MEDICINE
Payer: MEDICARE

## 2017-08-01 VITALS
WEIGHT: 119.5 LBS | SYSTOLIC BLOOD PRESSURE: 120 MMHG | BODY MASS INDEX: 21.17 KG/M2 | OXYGEN SATURATION: 95 % | HEART RATE: 91 BPM | HEIGHT: 63 IN | DIASTOLIC BLOOD PRESSURE: 80 MMHG

## 2017-08-01 DIAGNOSIS — T14.8XXA BRUISING: ICD-10-CM

## 2017-08-01 DIAGNOSIS — Z01.818 PRE-OPERATIVE EXAMINATION FOR INTERNAL MEDICINE: Primary | ICD-10-CM

## 2017-08-01 DIAGNOSIS — R09.89 ABDOMINAL BRUIT: ICD-10-CM

## 2017-08-01 DIAGNOSIS — I10 ESSENTIAL HYPERTENSION: ICD-10-CM

## 2017-08-01 DIAGNOSIS — Z01.818 PRE-OPERATIVE EXAMINATION FOR INTERNAL MEDICINE: ICD-10-CM

## 2017-08-01 PROCEDURE — 99499 UNLISTED E&M SERVICE: CPT | Mod: S$GLB,,, | Performed by: INTERNAL MEDICINE

## 2017-08-01 PROCEDURE — 1125F AMNT PAIN NOTED PAIN PRSNT: CPT | Mod: S$GLB,,, | Performed by: INTERNAL MEDICINE

## 2017-08-01 PROCEDURE — 99214 OFFICE O/P EST MOD 30 MIN: CPT | Mod: S$GLB,,, | Performed by: INTERNAL MEDICINE

## 2017-08-01 PROCEDURE — 99999 PR PBB SHADOW E&M-EST. PATIENT-LVL III: CPT | Mod: PBBFAC,,, | Performed by: INTERNAL MEDICINE

## 2017-08-01 PROCEDURE — 71020 XR CHEST PA AND LATERAL: CPT | Mod: 26,,, | Performed by: RADIOLOGY

## 2017-08-01 PROCEDURE — 93000 ELECTROCARDIOGRAM COMPLETE: CPT | Mod: S$GLB,,, | Performed by: INTERNAL MEDICINE

## 2017-08-01 PROCEDURE — 1159F MED LIST DOCD IN RCRD: CPT | Mod: S$GLB,,, | Performed by: INTERNAL MEDICINE

## 2017-08-01 RX ORDER — CIPROFLOXACIN 500 MG/1
500 TABLET ORAL 2 TIMES DAILY
Qty: 14 TABLET | Refills: 0 | Status: SHIPPED | OUTPATIENT
Start: 2017-08-01 | End: 2017-08-08

## 2017-08-01 NOTE — PROGRESS NOTES
"HISTORY OF PRESENT ILLNESS:  Pt. is a 71 y.o. female presents for pre op for knee arthroplasty with Dr. Kumar.  HTN is in control on current meds.  She is complaining of bruising.    Lab Results   Component Value Date    WBC 4.65 12/13/2016    HGB 13.4 12/13/2016    HCT 41.1 12/13/2016     12/13/2016    CHOL 212 (H) 12/13/2016    TRIG 107 12/13/2016    HDL 53 12/13/2016    ALT 18 12/13/2016    AST 15 12/13/2016     12/13/2016    K 4.3 12/13/2016     12/13/2016    CREATININE 0.8 12/13/2016    BUN 25 (H) 12/13/2016    CO2 26 12/13/2016    TSH 1.491 12/13/2016    HGBA1C 5.8 07/27/2012     Lab Results   Component Value Date    LDLCALC 137.6 12/13/2016     2D Echo:  "  CONCLUSIONS     1 - Normal left ventricular systolic function (EF 55-60%).     2 - Normal left ventricular diastolic function.         This document has been electronically    SIGNED BY: Chandler Flores MD On: 07/07/2015 15:58      Specimen Collected: 07/07/15 07:45 Last Resulted: 07/07/15 16:04"          STress echo 12/5/13 was WNL.      ROS:  GENERAL: No fever, chills, fatigability; positive intentional weight loss, but has lost even after trying; only 2 lbs in the last year/states weight has been stable, is eating quite healthy now;  SKIN: No rashes, itching or changes in color or texture of skin; thin skin/bruising;  HEAD: No headaches or recent head trauma.  EARS: Denies ear pain, discharge or vertigo.  NOSE: No loss of smell, no epistaxis; occ postnasal drip.  MOUTH & THROAT: No hoarseness or change in voice. No excessive gum bleeding.  NODES: Denies swollen glands.  CHEST: Denies BRUNO, cyanosis, wheezing, cough and sputum production.  CARDIOVASCULAR: Denies chest pain, PND, orthopnea or reduced exercise tolerance.  ABDOMEN: Appetite fine. No weight loss. Denies constipation, diarrhea, abdominal pain, hematemesis or blood in stool.  URINARY: No flank pain, dysuria or hematuria.  PERIPHERAL VASCULAR: No claudication or " cyanosis. No edema.  MUSCULOSKELETAL: Positive joint stiffness to knees; no swelling; wrists with positive ganglion. Denies back pain.  NEUROLOGIC: Denies numbness    PE:   Vitals:   Vitals:    08/01/17 0950   BP: 120/80   Pulse: 91     GENERAL: no acute distress, A&Ox3, comfortable.  Female with BMI of 21   HEENT: tympanic membranes clear, nasal mucosa pink, no pharyngeal erythema or exudate  NECK: supple, no cervical lymphadenopathy, no thyromegaly; no supraclavicular nodes;   CHEST:  clear to auscultation bilaterally, no crackles or wheeze; no increased work of breathing;  CARDIOVASCULAR: regular rate and rhythm, no rubs, murmurs or gallops.  ABDOMEN: normal bowel sounds, soft non-tender, non-distended; no palpable organomegaly; positive abdominal bruit;   EXT: no clubbing, cyanosis or edema.   SKIN: positive bruising;    ASSESSMENT/PLAN:    Pt. Is to stop her krill oil; discussed no aspirin products, no vit E;    Pre-operative examination for internal medicine  -     EKG 12-lead; Future  -     Urinalysis; Future; Expected date: 08/01/2017  -     CBC auto differential; Future; Expected date: 08/01/2017  -     Comprehensive metabolic panel; Future; Expected date: 08/01/2017  -     X-Ray Chest PA And Lateral; Future; Expected date: 08/01/2017    Abdominal bruit  -     US Abdominal Aorta; Future; Expected date: 08/01/2017    Bruising  -     APTT; Future; Expected date: 08/01/2017  -     Protime-INR; Future; Expected date: 08/01/2017    HTN: in control on current meds;      Call if condition changes or worsens.

## 2017-08-04 NOTE — TELEPHONE ENCOUNTER
----- Message from Génesis Starr sent at 8/4/2017 12:38 PM CDT -----  Contact: Pt   States she is calling to see why she has an ultrasound scheduled and can be reached at [phone]//thanks/dbw

## 2017-08-07 NOTE — TELEPHONE ENCOUNTER
Message from Dr Lozano     I sorry she doesn't remember, but at her appt. I heard an abdominal bruit.

## 2017-08-11 ENCOUNTER — HOSPITAL ENCOUNTER (OUTPATIENT)
Dept: RADIOLOGY | Facility: HOSPITAL | Age: 72
Discharge: HOME | End: 2017-08-11
Attending: INTERNAL MEDICINE
Payer: MEDICARE

## 2017-08-11 ENCOUNTER — TELEPHONE (OUTPATIENT)
Dept: ORTHOPEDICS | Facility: CLINIC | Age: 72
End: 2017-08-11

## 2017-08-11 DIAGNOSIS — R82.90 ABNORMAL URINALYSIS: Primary | ICD-10-CM

## 2017-08-11 DIAGNOSIS — R09.89 ABDOMINAL BRUIT: ICD-10-CM

## 2017-08-11 PROCEDURE — 76775 US EXAM ABDO BACK WALL LIM: CPT | Mod: TC,PO

## 2017-08-11 PROCEDURE — 76775 US EXAM ABDO BACK WALL LIM: CPT | Mod: 26,,, | Performed by: RADIOLOGY

## 2017-08-11 NOTE — TELEPHONE ENCOUNTER
Spoke with Ortho today. Pt is scheduled for surgery on Monday. Pt needs surgical  clearance.  Dr Lozano is out of office at [employer] meetings today. I have reached her via cell phone and call transferred to Ortho.

## 2017-08-11 NOTE — TELEPHONE ENCOUNTER
Patient has not been cleared by Dr. Lozano yet for surgery.  Dr. Lozano is at power of [employer] today.  I did end up getting her on the phone however.  She would like the patient to have a repeat UA which I ordered per Dr. Lozano.  Would like her to have this tomorrow however I am unable to get the patient on the phone.  Also Dr. Lozano would like Dr. Gamez to to clear the patient for her ECG.  I sent him a direct message regarding this.  Due to the unclear nature of clearance at this time I have contacted surgery center at [facility] and am having them switch this patient to Dr. Kumar's second case in hopes this criteria can be completed prior to surgery.

## 2017-08-11 NOTE — TELEPHONE ENCOUNTER
----- Message from Yue Gonzales sent at 8/11/2017  3:17 PM CDT -----  Contact: Betty Padilla calling about a medical clearance for patient's surgery on Monday.   She needs to make sure she is cleared for surgery.  Please call Betty at [phone]. Thanks!

## 2017-08-11 NOTE — TELEPHONE ENCOUNTER
Patient had an ECG on 8/1/17.  Spoke with Dr. Lozano and she would like to know if Dr. Gamez sees any contraindications to surgery specifically because of the ECG.

## 2017-08-12 ENCOUNTER — TELEPHONE (OUTPATIENT)
Dept: INTERNAL MEDICINE | Facility: CLINIC | Age: 72
End: 2017-08-12

## 2017-08-12 ENCOUNTER — LAB VISIT (OUTPATIENT)
Dept: LAB | Facility: HOSPITAL | Age: 72
End: 2017-08-12
Attending: INTERNAL MEDICINE
Payer: MEDICARE

## 2017-08-12 DIAGNOSIS — R82.90 ABNORMAL URINALYSIS: ICD-10-CM

## 2017-08-12 DIAGNOSIS — N39.0 URINARY TRACT INFECTION WITHOUT HEMATURIA, SITE UNSPECIFIED: Primary | ICD-10-CM

## 2017-08-12 LAB
BACTERIA #/AREA URNS HPF: ABNORMAL /HPF
BILIRUB UR QL STRIP: NEGATIVE
CLARITY UR: CLEAR
COLOR UR: YELLOW
GLUCOSE UR QL STRIP: NEGATIVE
HGB UR QL STRIP: NEGATIVE
KETONES UR QL STRIP: NEGATIVE
LEUKOCYTE ESTERASE UR QL STRIP: ABNORMAL
MICROSCOPIC COMMENT: ABNORMAL
NITRITE UR QL STRIP: NEGATIVE
PH UR STRIP: 6 [PH] (ref 5–8)
PROT UR QL STRIP: NEGATIVE
SP GR UR STRIP: 1.02 (ref 1–1.03)
SQUAMOUS #/AREA URNS HPF: 2 /HPF
URN SPEC COLLECT METH UR: ABNORMAL
WBC #/AREA URNS HPF: 7 /HPF (ref 0–5)

## 2017-08-12 PROCEDURE — 81000 URINALYSIS NONAUTO W/SCOPE: CPT | Mod: PO

## 2017-08-12 RX ORDER — DOXYCYCLINE 100 MG/1
100 CAPSULE ORAL 2 TIMES DAILY
Qty: 14 CAPSULE | Refills: 0 | Status: SHIPPED | OUTPATIENT
Start: 2017-08-12 | End: 2017-08-19

## 2017-08-13 ENCOUNTER — NURSE TRIAGE (OUTPATIENT)
Dept: ADMINISTRATIVE | Facility: CLINIC | Age: 72
End: 2017-08-13

## 2017-08-13 NOTE — TELEPHONE ENCOUNTER
Pt called re med re to surg. Having TKR celine am. Given rx for kidney infection. Seen yest at [facility]- still had urinary infection. Pt taken doxycycline. Spoke with dr fermin on call. MD states that he will notify dr Kumar. rec for pt to show for surg as planned. Pt notified. Call back with questions.     Reason for Disposition   Question about upcoming scheduled test, no triage required and triager able to answer question    Protocols used: ST INFORMATION ONLY CALL-A-AH

## 2017-08-14 ENCOUNTER — TELEPHONE (OUTPATIENT)
Dept: ORTHOPEDICS | Facility: CLINIC | Age: 72
End: 2017-08-14

## 2017-08-14 NOTE — TELEPHONE ENCOUNTER
----- Message from Batsheva Suggs sent at 8/14/2017  8:55 AM CDT -----  Contact:  // Delfin  Patient's  left a voice mail message on 8/13 at 10:00 wanting to speak with the nurse about her surgery being canceled for today.  Please call.

## 2017-08-14 NOTE — TELEPHONE ENCOUNTER
Spoke with patient.  We are waiting on her to finish her antibiotics and Dr. Gamez to message back regarding her EKG.  Once those are complete we can  Move forward with surgery.

## 2017-08-14 NOTE — TELEPHONE ENCOUNTER
----- Message from Rodrigo Chong sent at 8/11/2017  4:55 PM CDT -----  Contact: patient  Place call to pod,Patient returning call.please call back at [phone]. Thanks,

## 2017-08-22 ENCOUNTER — TELEPHONE (OUTPATIENT)
Dept: FAMILY MEDICINE | Facility: CLINIC | Age: 72
End: 2017-08-22

## 2017-08-22 ENCOUNTER — LAB VISIT (OUTPATIENT)
Dept: LAB | Facility: HOSPITAL | Age: 72
End: 2017-08-22
Attending: INTERNAL MEDICINE
Payer: MEDICARE

## 2017-08-22 DIAGNOSIS — N39.0 URINARY TRACT INFECTION WITHOUT HEMATURIA, SITE UNSPECIFIED: ICD-10-CM

## 2017-08-22 PROCEDURE — 87147 CULTURE TYPE IMMUNOLOGIC: CPT

## 2017-08-22 PROCEDURE — 87086 URINE CULTURE/COLONY COUNT: CPT

## 2017-08-22 PROCEDURE — 87088 URINE BACTERIA CULTURE: CPT

## 2017-08-22 NOTE — TELEPHONE ENCOUNTER
----- Message from Rosalva Pollard sent at 8/22/2017 11:25 AM CDT -----  Contact: wilber  Calling about kidney infection and rechecking it. She needs to get a knee replacement. Please call back [phone] (home)

## 2017-08-22 NOTE — TELEPHONE ENCOUNTER
Dr Lozano  Pt called stating she wanted to recheck urine as she needs to have her surgery. There was an order already in system so I just told her to come today

## 2017-08-23 LAB — BACTERIA UR CULT: NORMAL

## 2017-08-28 ENCOUNTER — PATIENT MESSAGE (OUTPATIENT)
Dept: ORTHOPEDICS | Facility: CLINIC | Age: 72
End: 2017-08-28

## 2017-08-28 ENCOUNTER — PATIENT MESSAGE (OUTPATIENT)
Dept: INTERNAL MEDICINE | Facility: CLINIC | Age: 72
End: 2017-08-28

## 2017-08-28 ENCOUNTER — TELEPHONE (OUTPATIENT)
Dept: ORTHOPEDICS | Facility: CLINIC | Age: 72
End: 2017-08-28

## 2017-08-28 NOTE — TELEPHONE ENCOUNTER
Patient had UA 2 wks ago that still showed her wbc a little high.  She would like to know should she continue to have this done or get more medication from her pcp. She is done with her anitbiotic.

## 2017-08-29 ENCOUNTER — TELEPHONE (OUTPATIENT)
Dept: CARDIOLOGY | Facility: CLINIC | Age: 72
End: 2017-08-29

## 2017-08-29 ENCOUNTER — PATIENT MESSAGE (OUTPATIENT)
Dept: CARDIOLOGY | Facility: CLINIC | Age: 72
End: 2017-08-29

## 2017-08-29 ENCOUNTER — PATIENT MESSAGE (OUTPATIENT)
Dept: ORTHOPEDICS | Facility: CLINIC | Age: 72
End: 2017-08-29

## 2017-08-29 NOTE — TELEPHONE ENCOUNTER
----- Message from Yoselin Snyder sent at 8/29/2017  1:28 PM CDT -----  Contact: self [phone]  She would like to know if her infection is cleared up.  Please call her.  Thank you!

## 2017-08-29 NOTE — TELEPHONE ENCOUNTER
----- Message from Alejo Vega sent at 8/28/2017  3:11 PM CDT -----  Contact: pt  Pt is requesting a callback, has questions about the urine sample that she dropped off last week  Call Back#[phone]  Thanks

## 2017-08-29 NOTE — TELEPHONE ENCOUNTER
----- Message from Rosalva Pollard sent at 8/29/2017  3:17 PM CDT -----  Contact: Honey  States she sent an e-mail to see if it ok to have a knee replacement by Dr. Kumar. She needs her EKG reviewed. Call back [phone] (home)   Thanks~!

## 2017-08-29 NOTE — TELEPHONE ENCOUNTER
Urinalysis shows white cells, but organism that grew was Strept agalactiea, which is usually a vaginal contaminant.  She is cleared for procedure.  Remember to contact Dr. Gamez as he had states eKG had changed from previous.

## 2017-08-31 ENCOUNTER — PATIENT MESSAGE (OUTPATIENT)
Dept: ORTHOPEDICS | Facility: CLINIC | Age: 72
End: 2017-08-31

## 2017-08-31 NOTE — TELEPHONE ENCOUNTER
Spoke with the patient this morning regarding her cardiac clearance she will call back with a name and number of how to fax

## 2017-09-01 ENCOUNTER — PATIENT MESSAGE (OUTPATIENT)
Dept: ORTHOPEDICS | Facility: CLINIC | Age: 72
End: 2017-09-01

## 2017-09-02 DIAGNOSIS — M25.562 PAIN IN JOINT OF LEFT KNEE: ICD-10-CM

## 2017-09-02 DIAGNOSIS — M21.162 GENU VARUM OF LEFT LOWER EXTREMITY: ICD-10-CM

## 2017-09-02 DIAGNOSIS — M17.12 PRIMARY OSTEOARTHRITIS OF LEFT KNEE: Primary | ICD-10-CM

## 2017-09-02 RX ORDER — SODIUM CHLORIDE 9 MG/ML
INJECTION, SOLUTION INTRAVENOUS CONTINUOUS
Status: CANCELLED | OUTPATIENT
Start: 2017-09-02

## 2017-09-15 ENCOUNTER — TELEPHONE (OUTPATIENT)
Dept: ORTHOPEDICS | Facility: CLINIC | Age: 72
End: 2017-09-15

## 2017-09-15 PROBLEM — Z96.659 S/P TKR (TOTAL KNEE REPLACEMENT) USING CEMENT: Status: ACTIVE | Noted: 2017-09-15

## 2017-09-15 NOTE — TELEPHONE ENCOUNTER
----- Message from Katlyn Alexander sent at 9/14/2017  2:28 PM CDT -----  Contact: Patti smith/ [facility]  Patti smith/ [facility] calling to speak with the Nurse about the patient's procedure tomorrow. Please advise. Call to pod. No answer.  Call back Patti   Thanks!

## 2017-09-15 NOTE — TELEPHONE ENCOUNTER
Contacted [facility] pre admit. Pre admit worker states they were calling to notify Dr Kumar pt does not have an H&P in system. Advised I would notify Dr Kumar. Pre admit worker verbalized understanding. Dr Kumar notified.

## 2017-09-27 ENCOUNTER — PATIENT MESSAGE (OUTPATIENT)
Dept: ADMINISTRATIVE | Facility: OTHER | Age: 72
End: 2017-09-27

## 2017-09-28 DIAGNOSIS — Z98.890 S/P KNEE SURGERY: Primary | ICD-10-CM

## 2017-09-28 RX ORDER — PRAVASTATIN SODIUM 40 MG/1
TABLET ORAL
Qty: 90 TABLET | Refills: 0 | Status: SHIPPED | OUTPATIENT
Start: 2017-09-28

## 2017-09-29 ENCOUNTER — HOSPITAL ENCOUNTER (OUTPATIENT)
Dept: RADIOLOGY | Facility: HOSPITAL | Age: 72
Discharge: HOME | End: 2017-09-29
Attending: ORTHOPAEDIC SURGERY
Payer: MEDICARE

## 2017-09-29 ENCOUNTER — OFFICE VISIT (OUTPATIENT)
Dept: ORTHOPEDICS | Facility: CLINIC | Age: 72
End: 2017-09-29
Payer: MEDICARE

## 2017-09-29 VITALS
BODY MASS INDEX: 21.72 KG/M2 | SYSTOLIC BLOOD PRESSURE: 129 MMHG | DIASTOLIC BLOOD PRESSURE: 67 MMHG | WEIGHT: 115.06 LBS | HEIGHT: 61 IN | HEART RATE: 93 BPM

## 2017-09-29 DIAGNOSIS — Z96.652 S/P TKR (TOTAL KNEE REPLACEMENT) USING CEMENT, LEFT: Primary | ICD-10-CM

## 2017-09-29 DIAGNOSIS — G89.18 POST-OP PAIN: ICD-10-CM

## 2017-09-29 DIAGNOSIS — Z98.890 S/P KNEE SURGERY: ICD-10-CM

## 2017-09-29 PROCEDURE — 99999 PR PBB SHADOW E&M-EST. PATIENT-LVL III: CPT | Mod: PBBFAC,,, | Performed by: ORTHOPAEDIC SURGERY

## 2017-09-29 PROCEDURE — 99024 POSTOP FOLLOW-UP VISIT: CPT | Mod: S$GLB,,, | Performed by: ORTHOPAEDIC SURGERY

## 2017-09-29 PROCEDURE — 73562 X-RAY EXAM OF KNEE 3: CPT | Mod: 26,LT,, | Performed by: RADIOLOGY

## 2017-09-29 PROCEDURE — 73562 X-RAY EXAM OF KNEE 3: CPT | Mod: TC,PO,LT

## 2017-09-29 NOTE — PROGRESS NOTES
"Subjective:          Chief Complaint: Honey Alejandre is a 71 y.o. female who had concerns including Post-op Evaluation of the Left Knee.    Mrs. Jeffries is here for f/u on her left knee. She is doing well and attending therapy. She is ambulating with her FWW.    Pain: 3/10    DATE OF PROCEDURE: 9/15/2017      ATTENDING SURGEON: Surgeon(s) and Role:     * Betito Kumar MD - Primary  ASSISTANT: JACOB Mckeon  PREOPERATIVE DIAGNOSIS: DJD knee M17.9, Genu Varum (acquired) M21.169 and LEFT     POSTOPERATIVE DIAGNOSIS: DJD knee M17.9, Genu Varum (acquired) M21.169 and LEFT     PROCEDURES PERFORMED: Replacement, Knee, Medial and Lateral compartment (Total Knee) 04832 and with femoral autologous bone grafting; LEFT     ANESTHESIA: Local, Regional w/ catheter  and spinal/epidural     FLUIDS IN THE CASE: 1700 ml     ESTIMATED BLOOD LOSS: 75cc     COMPLICATIONS: none     CONDITION ON RETURN TO RECOVERY ROOM: Good     IMPLANTS UTILIZED:  left   Depuy total knee  Depuy ATTUNE femoral 4N  left  Depuy ATTUNE tibial 4     Depuy fixed insert size 4", 6 mm thick,  ATTUNE 35 mm medialized patellar component; SmartSet with gentamicin was utilized on tibial and patellar components.        Review of Systems   Constitution: Negative for chills and fever.   Musculoskeletal: Positive for joint pain, joint swelling and stiffness.   All other systems reviewed and are negative.                  Objective:        General: Honey is well-developed, well-nourished, appears stated age, in no acute distress, alert and oriented to time, place and person.     General    Vitals reviewed.  Constitutional: She is oriented to person, place, and time. She appears well-developed and well-nourished. No distress.   HENT:   Head: Normocephalic and atraumatic.   Nose: Nose normal.   Eyes: Pupils are equal, round, and reactive to light.   Cardiovascular: Normal rate.    Pulmonary/Chest: Effort normal.   Neurological: She is alert and oriented to " person, place, and time.   Psychiatric: She has a normal mood and affect. Her behavior is normal. Judgment and thought content normal.     General Musculoskeletal Exam   Gait: antalgic       Right Knee Exam     Inspection   Deformity: deformity    Left Knee Exam     Inspection   Erythema: absent  Scars: present  Effusion: present  Bruising: present    Tenderness   Left knee tenderness location: posterior knee.    Range of Motion   Extension:  0 normal   Flexion:  110 abnormal     Tests   Patella   Patellar Tracking: normal  Patellar Glide (Quadrants): Lateral - 1 Medial - 1  Q-Angle at 90 degrees: normal    Other   Sensation: normal    Comments:  Incisions healing well    Vascular Exam       Left Pulses  Dorsalis Pedis:      2+  Posterior Tibial:      2+        Edema  Left Lower Leg: present        Current and previous radiographic studies and results were reviewed with the patient:   There is a well aligned and well seated left total knee arthroplasty.  No fracture or subluxation are identified.  There is obscuration of the left suprapatellar fat pad consistent with a knee joint effusion.   Impression       1.  Well aligned well seated left total knee arthroplasty.  2.  Knee joint effusion.           Assessment:       Encounter Diagnoses   Name Primary?    S/P TKR (total knee replacement) using cement, left Yes    Post-op pain           Plan:         Continue with therapy  Continue with Tylenol for pain control  Continue with FWW and can wean to cane   F/u in 2 weeks or sooner if needed

## 2017-10-10 ENCOUNTER — OFFICE VISIT (OUTPATIENT)
Dept: ORTHOPEDICS | Facility: CLINIC | Age: 72
End: 2017-10-10
Payer: MEDICARE

## 2017-10-10 VITALS
SYSTOLIC BLOOD PRESSURE: 131 MMHG | HEART RATE: 81 BPM | BODY MASS INDEX: 21.71 KG/M2 | HEIGHT: 61 IN | DIASTOLIC BLOOD PRESSURE: 68 MMHG | WEIGHT: 115 LBS

## 2017-10-10 DIAGNOSIS — G89.18 POST-OP PAIN: ICD-10-CM

## 2017-10-10 DIAGNOSIS — Z96.652 S/P TKR (TOTAL KNEE REPLACEMENT) USING CEMENT, LEFT: Primary | ICD-10-CM

## 2017-10-10 PROCEDURE — 99999 PR PBB SHADOW E&M-EST. PATIENT-LVL III: CPT | Mod: PBBFAC,,, | Performed by: ORTHOPAEDIC SURGERY

## 2017-10-10 PROCEDURE — 99024 POSTOP FOLLOW-UP VISIT: CPT | Mod: S$GLB,,, | Performed by: ORTHOPAEDIC SURGERY

## 2017-10-10 NOTE — PROGRESS NOTES
"Subjective:          Chief Complaint: Honey Alejandre is a 71 y.o. female who had concerns including Pain and Post-op Evaluation of the Left Knee.    Mrs. Jeffries is here for f/u on her left knee. She is doing well and attending therapy. She is ambulating with her FWW.    Pain: 7/10; pain is increased after therapy    DATE OF PROCEDURE: 9/15/2017      ATTENDING SURGEON: Surgeon(s) and Role:     * Betito Kumar MD - Primary  ASSISTANT: JACOB Mckeon  PREOPERATIVE DIAGNOSIS: DJD knee M17.9, Genu Varum (acquired) M21.169 and LEFT     POSTOPERATIVE DIAGNOSIS: DJD knee M17.9, Genu Varum (acquired) M21.169 and LEFT     PROCEDURES PERFORMED: Replacement, Knee, Medial and Lateral compartment (Total Knee) 93410 and with femoral autologous bone grafting; LEFT     ANESTHESIA: Local, Regional w/ catheter  and spinal/epidural     FLUIDS IN THE CASE: 1700 ml     ESTIMATED BLOOD LOSS: 75cc     COMPLICATIONS: none     CONDITION ON RETURN TO RECOVERY ROOM: Good     IMPLANTS UTILIZED:  left   Depuy total knee  Depuy ATTUNE femoral 4N  left  Depuy ATTUNE tibial 4     Depuy fixed insert size 4", 6 mm thick,  ATTUNE 35 mm medialized patellar component; SmartSet with gentamicin was utilized on tibial and patellar components.      Pain   Associated symptoms include joint swelling. Pertinent negatives include no chills or fever.       Review of Systems   Constitution: Negative for chills and fever.   Musculoskeletal: Positive for joint pain, joint swelling and stiffness.   All other systems reviewed and are negative.                  Objective:        General: Honey is well-developed, well-nourished, appears stated age, in no acute distress, alert and oriented to time, place and person.     General    Vitals reviewed.  Constitutional: She is oriented to person, place, and time. She appears well-developed and well-nourished. No distress.   HENT:   Head: Normocephalic and atraumatic.   Nose: Nose normal.   Eyes: Pupils are equal, " round, and reactive to light.   Cardiovascular: Normal rate.    Pulmonary/Chest: Effort normal.   Neurological: She is alert and oriented to person, place, and time.   Psychiatric: She has a normal mood and affect. Her behavior is normal. Judgment and thought content normal.     General Musculoskeletal Exam   Gait: antalgic       Right Knee Exam     Inspection   Deformity: deformity    Left Knee Exam     Inspection   Erythema: absent  Scars: present  Effusion: present  Bruising: absent    Tenderness   Left knee tenderness location: posterior knee.    Range of Motion   Extension:  0 normal   Flexion:  120 abnormal     Tests   Stability   MCL - Valgus: normal (0 to 2mm)  LCL - Varus: normal (0 to 2mm)  Patella   Patellar Tracking: normal  Patellar Glide (Quadrants): Lateral - 1 Medial - 2  Q-Angle at 90 degrees: normal    Other   Sensation: normal    Comments:  Incisions healing well    Muscle Strength   Left Lower Extremity   Hip Abduction: 5/5   Quadriceps:  5/5   Hamstrin/5     Vascular Exam       Left Pulses  Dorsalis Pedis:      2+  Posterior Tibial:      2+        Edema  Left Lower Leg: present        Previous radiographic studies and results were reviewed with the patient:   There is a well aligned and well seated left total knee arthroplasty.  No fracture or subluxation are identified.  There is obscuration of the left suprapatellar fat pad consistent with a knee joint effusion.   Impression       1.  Well aligned well seated left total knee arthroplasty.  2.  Knee joint effusion.           Assessment:       Encounter Diagnoses   Name Primary?    Post-op pain     S/P TKR (total knee replacement) using cement, left Yes          Plan:         Can take NSAIDs  Continue with therapy  Continue with Tylenol for pain control  Continue with FWW and can wean to cane   F/u in 4 weeks or sooner if needed

## 2017-10-16 RX ORDER — LOSARTAN POTASSIUM 50 MG/1
TABLET ORAL
Qty: 90 TABLET | Refills: 2 | Status: SHIPPED | OUTPATIENT
Start: 2017-10-16 | End: 2018-06-20 | Stop reason: SDUPTHER

## 2017-11-07 ENCOUNTER — OFFICE VISIT (OUTPATIENT)
Dept: ORTHOPEDICS | Facility: CLINIC | Age: 72
End: 2017-11-07
Payer: MEDICARE

## 2017-11-07 ENCOUNTER — OFFICE VISIT (OUTPATIENT)
Dept: FAMILY MEDICINE | Facility: CLINIC | Age: 72
End: 2017-11-07
Payer: MEDICARE

## 2017-11-07 VITALS
DIASTOLIC BLOOD PRESSURE: 71 MMHG | SYSTOLIC BLOOD PRESSURE: 154 MMHG | HEART RATE: 84 BPM | BODY MASS INDEX: 22.51 KG/M2 | HEIGHT: 61 IN | WEIGHT: 119.25 LBS

## 2017-11-07 VITALS
DIASTOLIC BLOOD PRESSURE: 80 MMHG | HEIGHT: 61 IN | HEART RATE: 77 BPM | BODY MASS INDEX: 22.47 KG/M2 | WEIGHT: 119 LBS | SYSTOLIC BLOOD PRESSURE: 158 MMHG

## 2017-11-07 DIAGNOSIS — R09.89 ABDOMINAL BRUIT: ICD-10-CM

## 2017-11-07 DIAGNOSIS — Z00.00 ENCOUNTER FOR PREVENTIVE HEALTH EXAMINATION: Primary | ICD-10-CM

## 2017-11-07 DIAGNOSIS — Z96.652 S/P TKR (TOTAL KNEE REPLACEMENT) USING CEMENT, LEFT: ICD-10-CM

## 2017-11-07 DIAGNOSIS — I10 ESSENTIAL HYPERTENSION: ICD-10-CM

## 2017-11-07 DIAGNOSIS — I70.0 AORTIC ATHEROSCLEROSIS: ICD-10-CM

## 2017-11-07 DIAGNOSIS — M17.0 PRIMARY OSTEOARTHRITIS OF BOTH KNEES: ICD-10-CM

## 2017-11-07 DIAGNOSIS — I77.819 AORTIC ECTASIA: ICD-10-CM

## 2017-11-07 DIAGNOSIS — Z96.652 S/P TKR (TOTAL KNEE REPLACEMENT) USING CEMENT, LEFT: Primary | ICD-10-CM

## 2017-11-07 DIAGNOSIS — I25.10 CORONARY ARTERY DISEASE DUE TO LIPID RICH PLAQUE: ICD-10-CM

## 2017-11-07 DIAGNOSIS — M21.161 GENU VARUM OF RIGHT LOWER EXTREMITY: ICD-10-CM

## 2017-11-07 DIAGNOSIS — I25.83 CORONARY ARTERY DISEASE DUE TO LIPID RICH PLAQUE: ICD-10-CM

## 2017-11-07 DIAGNOSIS — E78.5 HYPERLIPIDEMIA, UNSPECIFIED HYPERLIPIDEMIA TYPE: ICD-10-CM

## 2017-11-07 DIAGNOSIS — G89.18 POST-OP PAIN: ICD-10-CM

## 2017-11-07 PROBLEM — T14.8XXA BRUISING: Status: RESOLVED | Noted: 2017-07-12 | Resolved: 2017-11-07

## 2017-11-07 PROCEDURE — 99024 POSTOP FOLLOW-UP VISIT: CPT | Mod: S$GLB,,, | Performed by: ORTHOPAEDIC SURGERY

## 2017-11-07 PROCEDURE — 99999 PR PBB SHADOW E&M-EST. PATIENT-LVL III: CPT | Mod: PBBFAC,,, | Performed by: NURSE PRACTITIONER

## 2017-11-07 PROCEDURE — G0439 PPPS, SUBSEQ VISIT: HCPCS | Mod: S$GLB,,, | Performed by: NURSE PRACTITIONER

## 2017-11-07 PROCEDURE — 99999 PR PBB SHADOW E&M-EST. PATIENT-LVL III: CPT | Mod: PBBFAC,,, | Performed by: ORTHOPAEDIC SURGERY

## 2017-11-07 PROCEDURE — 99499 UNLISTED E&M SERVICE: CPT | Mod: S$GLB,,, | Performed by: NURSE PRACTITIONER

## 2017-11-07 NOTE — PROGRESS NOTES
"Honey Alejandre presented for a  Medicare AWV and comprehensive Health Risk Assessment today. The following components were reviewed and updated:    · Medical history  · Family History  · Social history  · Allergies and Current Medications  · Health Risk Assessment  · Health Maintenance  · Care Team     ** See Completed Assessments for Annual Wellness Visit within the encounter summary.**       The following assessments were completed:  · Living Situation  · CAGE  · Depression Screening  · Timed Get Up and Go  · Whisper Test  · Cognitive Function Screening          · Nutrition Screening  · ADL Screening  · PAQ Screening    Vitals:    11/07/17 1019   BP: (!) 154/71   BP Location: Left arm   Patient Position: Sitting   BP Method: Medium (Automatic)   Pulse: 84   Weight: 54.1 kg (119 lb 4.3 oz)   Height: 5' 1" (1.549 m)     Body mass index is 22.54 kg/m².  Physical Exam   Constitutional: She is oriented to person, place, and time. She appears well-developed and well-nourished. No distress.   HENT:   Head: Normocephalic and atraumatic.   Neck: Carotid bruit is not present.   Cardiovascular: Normal rate, regular rhythm and normal heart sounds.    No murmur heard.  Pulmonary/Chest: Effort normal and breath sounds normal. No respiratory distress. She has no wheezes.   Neurological: She is alert and oriented to person, place, and time.   Unsteady gait   Skin: Skin is warm and dry.   Psychiatric: She has a normal mood and affect. Her behavior is normal. Judgment and thought content normal.         Diagnoses and health risks identified today and associated recommendations/orders:    1. Encounter for preventive health examination  Reviewed health maintenance and provided recommendations         2. Aortic ectasia  Continue to monitor   Followed by Mickie Lozano MD .   Abd Aort US 8/11/17    3. Aortic atherosclerosis  Continue to monitor lipids  Followed by Mickie Lozano MD .   Abd Aort US 8/11/17    4. Essential " hypertension  Encourage low na diet    Followed by Mickie Lozano MD .       5. Hyperlipidemia, unspecified hyperlipidemia type  Continue to monitor   Taking statin  Followed by Mickie Lozano MD .       6. Coronary artery disease due to lipid rich plaque  Stable.   No cp  Followed by Mark.       7. S/P TKR (total knee replacement) using cement, left  Stable.     Followed by José.       8. Primary osteoarthritis of both knees  Stable.     Followed by José.       9. Genu varum of right lower extremity  Stable.     Followed by José.       10. Abdominal bruit  Stable.     Followed by Mark.         Provided Honey with a 5-10 year written screening schedule and personal prevention plan. Recommendations were developed using the USPSTF age appropriate recommendations. Education, counseling, and referrals were provided as needed. After Visit Summary printed and given to patient which includes a list of additional screenings\tests needed.    Return in about 1 year (around 11/7/2018).    Inez White NP

## 2017-11-07 NOTE — PATIENT INSTRUCTIONS
Counseling and Referral of Other Preventative  (Italic type indicates deductible and co-insurance are waived)    Patient Name: Honey Alejandre  Today's Date: 11/7/2017      SERVICE LIMITATIONS RECOMMENDATION    Vaccines    · Pneumococcal (once after 65)    · Influenza (annually)    · Hepatitis B (if medium/high risk)    · Prevnar 13      Hepatitis B medium/high risk factors:       - End-stage renal disease       - Hemophiliacs who received Factor VII or         IX concentrates       - Clients of institutions for the mentally             retarded       - Persons who live in the same house as          a HepB carrier       - Homosexual men       - Illicit injectable drug abusers     Pneumococcal: Done, no repeat necessary     Influenza: Done, repeat in one year     Hepatitis B: N/A     Prevnar 13: Done, no repeat necessary    Mammogram (biennial age 50-74)  Annually (age 40 or over)  Done this year, repeat every year    Pap (up to age 70 and after 70 if unknown history or abnormal study last 10 years)    N/A     The USPSTF recommends against screening for cervical cancer in women older than age 65 years who have had adequate prior screening and are not otherwise at high risk for cervical cancer.      Colorectal cancer screening (to age 75)    · Fecal occult blood test (annual)  · Flexible sigmoidoscopy (5y)  · Screening colonoscopy (10y)  · Barium enema   Last done 2013, recommend to repeat every 5  years    Diabetes self-management training (no USPSTF recommendations)  Requires referral by treating physician for patient with diabetes or renal disease. 10 hours of initial DSMT sessions of no less than 30 minutes each in a continuous 12-month period. 2 hours of follow-up DSMT in subsequent years.  N/A    Bone mass measurements (age 65 & older, biennial)  Requires diagnosis related to osteoporosis or estrogen deficiency. Biennial benefit unless patient has history of long-term glucocorticoid  Last done 2017, recommend to  repeat every 2-3  years    Glaucoma screening (no USPSTF recommendation)  Diabetes mellitus, family history   , age 50 or over    American, age 65 or over  Recommend follow up with eye care professional regularly    Medical nutrition therapy for diabetes or renal disease (no recommended schedule)  Requires referral by treating physician for patient with diabetes or renal disease or kidney transplant within the past 3 years.  Can be provided in same year as diabetes self-management training (DSMT), and CMS recommends medical nutrition therapy take place after DSMT. Up to 3 hours for initial year and 2 hours in subsequent years.  N/A    Cardiovascular screening blood tests (every 5 years)  · Fasting lipid panel  Order as a panel if possible  Done this year, repeat every year    Diabetes screening tests (at least every 3 years, Medicare covers annually or at 6-month intervals for prediabetic patients)  · Fasting blood sugar (FBS) or glucose tolerance test (GTT)  Patient must be diagnosed with one of the following:       - Hypertension       - Dyslipidemia       - Obesity (BMI 30kg/m2)       - Previous elevated impaired FBS or GTT       ... or any two of the following:       - Overweight (BMI 25 but <30)       - Family history of diabetes       - Age 65 or older       - History of gestational diabetes or birth of baby weighing more than 9 pounds  Done this year, repeat every year    HIV screening (annually for increased risk patients)  · HIV-1 and HIV-2 by EIA, or DAREK, rapid antibody test or oral mucosa transudate  Patients must be at increased risk for HIV infection per USPSTF guidelines or pregnant. Tests covered annually for patient at increased risk or as requested by the patient. Pregnant patients may receive up to 3 tests during pregnancy.  Risks discussed, screening is not recommended    Smoking cessation counseling (up to 8 sessions per year)  Patients must be asymptomatic of  tobacco-related conditions to receive as a preventative service.  Non-smoker    Subsequent annual wellness visit  At least 12 months since last AWV  Return in one year     The following information is provided to all patients.  This information is to help you find resources for any of the problems found today that may be affecting your health:                Living healthy guide: www.[facility]      Understanding Diabetes: www.diabetes.org      Eating healthy: www.cdc.gov/healthyweight      CDC home safety checklist: www.cdc.gov/steadi/patient.html      Agency on Aging: www.goea.[place].[facility]      Alcoholics anonymous (AA): www.aa.org      Physical Activity: www.kathryn.nih.gov/tk9lczl      Tobacco use: www.quitwithusla.org

## 2017-11-07 NOTE — PROGRESS NOTES
"Subjective:          Chief Complaint: Honey Alejandre is a 72 y.o. female who had concerns including Pain of the Left Knee.    Mrs. Jeffries is here for f/u on her left knee. She is doing well and attending therapy. She is ambulating without any assistive devices.    Pain: 1/10; pain is primarily over the posterior aspect of the knee    DATE OF PROCEDURE: 9/15/2017      ATTENDING SURGEON: Surgeon(s) and Role:     * Betito Kumar MD - Primary  ASSISTANT: JACOB Mckeon  PREOPERATIVE DIAGNOSIS: DJD knee M17.9, Genu Varum (acquired) M21.169 and LEFT     POSTOPERATIVE DIAGNOSIS: DJD knee M17.9, Genu Varum (acquired) M21.169 and LEFT     PROCEDURES PERFORMED: Replacement, Knee, Medial and Lateral compartment (Total Knee) 93847 and with femoral autologous bone grafting; LEFT     ANESTHESIA: Local, Regional w/ catheter  and spinal/epidural     FLUIDS IN THE CASE: 1700 ml     ESTIMATED BLOOD LOSS: 75cc     COMPLICATIONS: none     CONDITION ON RETURN TO RECOVERY ROOM: Good     IMPLANTS UTILIZED:  left   Depuy total knee  Depuy ATTUNE femoral 4N  left  Depuy ATTUNE tibial 4     Depuy fixed insert size 4", 6 mm thick,  ATTUNE 35 mm medialized patellar component; SmartSet with gentamicin was utilized on tibial and patellar components.      Pain   Pertinent negatives include no chills, fever or joint swelling.       Review of Systems   Constitution: Negative for chills and fever.   Cardiovascular: Positive for leg swelling.   Musculoskeletal: Positive for joint pain and stiffness. Negative for joint swelling.   All other systems reviewed and are negative.                  Objective:        General: Honey is well-developed, well-nourished, appears stated age, in no acute distress, alert and oriented to time, place and person.     General    Vitals reviewed.  Constitutional: She is oriented to person, place, and time. She appears well-developed and well-nourished. No distress.   HENT:   Head: Normocephalic and atraumatic. "   Nose: Nose normal.   Eyes: Pupils are equal, round, and reactive to light.   Cardiovascular: Normal rate.    Pulmonary/Chest: Effort normal.   Neurological: She is alert and oriented to person, place, and time.   Psychiatric: She has a normal mood and affect. Her behavior is normal. Judgment and thought content normal.     General Musculoskeletal Exam   Gait: antalgic       Right Knee Exam     Inspection   Deformity: deformity    Left Knee Exam     Inspection   Erythema: absent  Scars: present  Effusion: absent  Bruising: absent    Tenderness   Left knee tenderness location: posterior knee.    Range of Motion   Extension:  0 normal   Flexion:  120 normal     Tests   Stability   MCL - Valgus: normal (0 to 2mm)  LCL - Varus: normal (0 to 2mm)  Patella   Patellar Apprehension: negative  Patellar Tracking: normal  Patellar Glide (Quadrants): Lateral - 1 Medial - 2  Q-Angle at 90 degrees: normal  Patellar Grind: negative    Other   Muscle Tightness: hamstring tightness  Sensation: normal    Comments:  Incisions healing well    Muscle Strength   Left Lower Extremity   Hip Abduction: 5/5   Quadriceps:  5/5   Hamstrin/5     Vascular Exam       Left Pulses  Dorsalis Pedis:      2+  Posterior Tibial:      2+        Edema  Left Lower Leg: present        Previous radiographic studies and results were reviewed with the patient:   There is a well aligned and well seated left total knee arthroplasty.  No fracture or subluxation are identified.  There is obscuration of the left suprapatellar fat pad consistent with a knee joint effusion.   Impression       1.  Well aligned well seated left total knee arthroplasty.  2.  Knee joint effusion.           Assessment:       Encounter Diagnoses   Name Primary?    S/P TKR (total knee replacement) using cement, left Yes    Post-op pain           Plan:         Can take NSAIDs  Continue with therapy and can transition to therapy at home with her son-in-law who is a chiropractor for  continued knee exercises.  Continue with Tylenol for pain control  F/u in 6 weeks or sooner if needed

## 2017-12-02 ENCOUNTER — TELEPHONE (OUTPATIENT)
Dept: INTERNAL MEDICINE | Facility: CLINIC | Age: 72
End: 2017-12-02

## 2017-12-04 RX ORDER — PRAVASTATIN SODIUM 40 MG/1
TABLET ORAL
Qty: 90 TABLET | Refills: 0 | OUTPATIENT
Start: 2017-12-04

## 2017-12-04 NOTE — TELEPHONE ENCOUNTER
Pt. Needs to come in NOW for Lipid panel.  Cannot send in to [facility] for 3 month supply.  Had ordered back 7/17, was never done.  Does she need me to send in partial refill to local pharmacy?

## 2017-12-05 ENCOUNTER — LAB VISIT (OUTPATIENT)
Dept: LAB | Facility: HOSPITAL | Age: 72
End: 2017-12-05
Attending: INTERNAL MEDICINE
Payer: MEDICARE

## 2017-12-05 DIAGNOSIS — E78.5 HYPERLIPIDEMIA, UNSPECIFIED HYPERLIPIDEMIA TYPE: ICD-10-CM

## 2017-12-05 LAB
CHOLEST SERPL-MCNC: 202 MG/DL
CHOLEST/HDLC SERPL: 3.8 {RATIO}
HDLC SERPL-MCNC: 53 MG/DL
HDLC SERPL: 26.2 %
LDLC SERPL CALC-MCNC: 121.8 MG/DL
NONHDLC SERPL-MCNC: 149 MG/DL
TRIGL SERPL-MCNC: 136 MG/DL

## 2017-12-05 PROCEDURE — 80061 LIPID PANEL: CPT

## 2017-12-05 PROCEDURE — 36415 COLL VENOUS BLD VENIPUNCTURE: CPT | Mod: PO

## 2017-12-19 ENCOUNTER — OFFICE VISIT (OUTPATIENT)
Dept: ORTHOPEDICS | Facility: CLINIC | Age: 72
End: 2017-12-19
Payer: MEDICARE

## 2017-12-19 VITALS
HEIGHT: 61 IN | BODY MASS INDEX: 22.47 KG/M2 | HEART RATE: 81 BPM | DIASTOLIC BLOOD PRESSURE: 75 MMHG | SYSTOLIC BLOOD PRESSURE: 138 MMHG | WEIGHT: 119 LBS

## 2017-12-19 DIAGNOSIS — Z96.652 S/P TKR (TOTAL KNEE REPLACEMENT) USING CEMENT, LEFT: Primary | ICD-10-CM

## 2017-12-19 DIAGNOSIS — G89.18 POST-OP PAIN: ICD-10-CM

## 2017-12-19 PROCEDURE — 99213 OFFICE O/P EST LOW 20 MIN: CPT | Mod: S$GLB,,, | Performed by: ORTHOPAEDIC SURGERY

## 2017-12-19 PROCEDURE — 99999 PR PBB SHADOW E&M-EST. PATIENT-LVL III: CPT | Mod: PBBFAC,,, | Performed by: ORTHOPAEDIC SURGERY

## 2017-12-19 NOTE — PROGRESS NOTES
"Subjective:          Chief Complaint: Honey Alejandre is a 72 y.o. female who had concerns including Post-op Evaluation and Pain of the Left Knee.    Mrs. Jeffries is here for f/u on her left knee. She is doing well and attending therapy. She is ambulating without any assistive devices. She does have some continued loss of flexion    Pain: 1/10    DATE OF PROCEDURE: 9/15/2017      ATTENDING SURGEON: Surgeon(s) and Role:     * Betito Kumar MD - Primary  ASSISTANT: JACOB Mckeon  PREOPERATIVE DIAGNOSIS: DJD knee M17.9, Genu Varum (acquired) M21.169 and LEFT     POSTOPERATIVE DIAGNOSIS: DJD knee M17.9, Genu Varum (acquired) M21.169 and LEFT     PROCEDURES PERFORMED: Replacement, Knee, Medial and Lateral compartment (Total Knee) 15376 and with femoral autologous bone grafting; LEFT     ANESTHESIA: Local, Regional w/ catheter  and spinal/epidural     FLUIDS IN THE CASE: 1700 ml     ESTIMATED BLOOD LOSS: 75cc     COMPLICATIONS: none     CONDITION ON RETURN TO RECOVERY ROOM: Good     IMPLANTS UTILIZED:  left   Depuy total knee  Depuy ATTUNE femoral 4N  left  Depuy ATTUNE tibial 4     Depuy fixed insert size 4", 6 mm thick,  ATTUNE 35 mm medialized patellar component; SmartSet with gentamicin was utilized on tibial and patellar components.      Pain   Pertinent negatives include no chest pain, chills, coughing, diaphoresis, fever, headaches, joint swelling, myalgias, nausea, neck pain, numbness, rash or vomiting.   Leg Pain    The pain is present in the left knee. This is a recurrent problem. The current episode started more than 1 year ago. There has been no history of extremity trauma. The injury was the result of a twisting action while at home. The problem occurs intermittently. The problem has been gradually improving. The quality of the pain is described as sharp. The pain is at a severity of 6/10. Associated symptoms include an inability to bear weight, joint locking, a limited range of motion and " stiffness. Pertinent negatives include no fever, itching, numbness or tingling. The symptoms are aggravated by walking. She has tried chiropractic manipulation for the symptoms. The treatment provided significant relief. Physical therapy was effective.      Review of Systems   Constitution: Negative for chills, diaphoresis and fever.   HENT: Negative for ear pain, hearing loss, nosebleeds and tinnitus.    Eyes: Negative for discharge, pain, redness and visual disturbance.   Cardiovascular: Negative for chest pain, leg swelling and palpitations.   Respiratory: Negative for cough, shortness of breath and wheezing.    Endocrine: Negative for polydipsia and polyuria.   Skin: Negative for itching and rash.   Musculoskeletal: Positive for joint pain and stiffness. Negative for back pain, joint swelling, myalgias and neck pain.   Gastrointestinal: Negative for constipation, diarrhea, nausea and vomiting.   Genitourinary: Negative for dysuria, frequency and hematuria.   Neurological: Negative for dizziness, headaches, numbness, seizures and tingling.   Psychiatric/Behavioral: The patient is not nervous/anxious.    Allergic/Immunologic: Negative for environmental allergies.   All other systems reviewed and are negative.                  Objective:        General: Honey is well-developed, well-nourished, appears stated age, in no acute distress, alert and oriented to time, place and person.     General    Vitals reviewed.  Constitutional: She is oriented to person, place, and time. She appears well-developed and well-nourished. No distress.   HENT:   Head: Normocephalic and atraumatic.   Nose: Nose normal.   Eyes: Pupils are equal, round, and reactive to light.   Cardiovascular: Normal rate.    Pulmonary/Chest: Effort normal.   Neurological: She is alert and oriented to person, place, and time.   Psychiatric: She has a normal mood and affect. Her behavior is normal. Judgment and thought content normal.     General  Musculoskeletal Exam   Gait: antalgic       Right Knee Exam     Inspection   Deformity: deformity    Left Knee Exam     Inspection   Erythema: absent  Scars: present  Swelling: absent  Effusion: absent  Deformity: deformity  Bruising: absent    Tenderness   Left knee tenderness location: posterior knee.    Range of Motion   Extension:  0 normal   Flexion: normal Left knee flexion: 125.     Tests   Stability   MCL - Valgus: normal (0 to 2mm)  LCL - Varus: normal (0 to 2mm)  Patella   Patellar Apprehension: negative  Patellar Tracking: normal  Patellar Glide (Quadrants): Lateral - 1 Medial - 2  Q-Angle at 90 degrees: normal  Patellar Grind: negative    Other   Muscle Tightness: quadriceps tightness  Sensation: normal    Comments:  Incisions healed    Muscle Strength   Left Lower Extremity   Hip Abduction: 5/5   Quadriceps:  5/5   Hamstrin/5     Vascular Exam       Left Pulses  Dorsalis Pedis:      2+  Posterior Tibial:      2+        Edema  Left Lower Leg: absent        No new rads today        Assessment:       Encounter Diagnoses   Name Primary?    S/P TKR (total knee replacement) using cement, left Yes    Post-op pain           Plan:         Continue with advancing left knee ROM exercises  Continue with Tylenol for pain control  F/U with Dr. Diamond in the future for right knee    Answers for HPI/ROS submitted by the patient on 2017   Leg pain  activity change: No  unexpected weight change: No  rhinorrhea: No  trouble swallowing: No  chest tightness: No  blood in stool: No  difficulty urinating: No  menstrual problem: No  arthralgias: No  confusion: No  dysphoric mood: No  appetite change : No  sleep disturbance: No  IMMUNOCOMPROMISED: No  sinus pressure : No  food allergies: No  painful intercourse: No  Radiating Pain: No

## 2018-03-19 ENCOUNTER — TELEPHONE (OUTPATIENT)
Dept: INTERNAL MEDICINE | Facility: CLINIC | Age: 73
End: 2018-03-19

## 2018-03-19 ENCOUNTER — OFFICE VISIT (OUTPATIENT)
Dept: ORTHOPEDICS | Facility: CLINIC | Age: 73
End: 2018-03-19
Payer: MEDICARE

## 2018-03-19 VITALS — HEIGHT: 61 IN | BODY MASS INDEX: 22.47 KG/M2 | WEIGHT: 119 LBS

## 2018-03-19 DIAGNOSIS — G89.29 CHRONIC PAIN OF RIGHT KNEE: Primary | ICD-10-CM

## 2018-03-19 DIAGNOSIS — M17.11 PRIMARY OSTEOARTHRITIS OF RIGHT KNEE: ICD-10-CM

## 2018-03-19 DIAGNOSIS — M17.11 RIGHT KNEE DJD: ICD-10-CM

## 2018-03-19 DIAGNOSIS — Z96.652 HISTORY OF LEFT KNEE REPLACEMENT: ICD-10-CM

## 2018-03-19 DIAGNOSIS — E78.5 HYPERLIPIDEMIA, UNSPECIFIED HYPERLIPIDEMIA TYPE: ICD-10-CM

## 2018-03-19 DIAGNOSIS — M25.561 CHRONIC PAIN OF RIGHT KNEE: Primary | ICD-10-CM

## 2018-03-19 DIAGNOSIS — Z01.818 PRE-OPERATIVE EXAMINATION FOR INTERNAL MEDICINE: Primary | ICD-10-CM

## 2018-03-19 PROCEDURE — 99999 PR PBB SHADOW E&M-EST. PATIENT-LVL II: CPT | Mod: PBBFAC,,, | Performed by: ORTHOPAEDIC SURGERY

## 2018-03-19 PROCEDURE — 99214 OFFICE O/P EST MOD 30 MIN: CPT | Mod: 57,S$GLB,, | Performed by: ORTHOPAEDIC SURGERY

## 2018-03-19 NOTE — LETTER
March 19, 2018      Betito Kumar MD  [address] [facility] [address] [place] [address]           [facility]  [address] [facility] [address] [place] [address]  Phone: [phone]          Patient: Honey Alejandre   MR Number: [id number]   YOB: 1945   Date of Visit: 3/19/2018       Dear Dr. Betito Kumar:    Thank you for referring Honey Alejandre to me for evaluation. Attached you will find relevant portions of my assessment and plan of care.    If you have questions, please do not hesitate to call me. I look forward to following Honey Alejandre along with you.    Sincerely,    Javier Diamond MD    Enclosure  CC:  No Recipients    If you would like to receive this communication electronically, please contact [email or web address] or [phone] to request more information on [facility] access.    For providers and/or their staff who would like to refer a patient to [facility], please contact us through our one-stop-shop provider referral line, [facility], at [phone].    If you feel you have received this communication in error or would no longer like to receive these types of communications, please e-mail [email or web address]

## 2018-03-19 NOTE — TELEPHONE ENCOUNTER
Scheduled pt for labs, chest xray and EKG for tomorrow AM. Pt voiced understanding for all appts. CLC

## 2018-03-19 NOTE — PROGRESS NOTES
Honey Jeffries 72 years old, complaining of right knee pain, had this now for a   few years' time.  She has failed longstanding nonoperative course, injections,   therapy, bracing, still has pain, 5/10 on good days, 10/10 on bad days.  She did   have a left-sided knee replacement several months ago, responded well.  She is   interested in arthroplasty on the right knee.    Exam shows tenderness at the joint line.  No signs of infection.  No   instability.  Skin is intact.  Compartments are soft.    X-rays show arthritic changes.    ASSESSMENT:  Right knee arthrosis.    PLAN:  We will schedule for a total knee replacement.  She is aware of the risks   and benefits and limitations of surgery, and she still wants to proceed.  We   look forward to seeing her at the time of surgery.      MATHIEU/NYDIA  dd: 03/19/2018 11:05:29 (CDT)  td: 03/20/2018 01:50:10 (CDT)  Doc ID   #[id number]  Job ID #[id number]    CC:     Further History  Aching pain  Worse with activity  Relieved with rest  No other associated symptoms  No other radiation    Further Exam  Alert and oriented  Pleasant  Contralateral limb has appropriate range of motion for age and condition  Contralateral limb has appropriate strength for age and condition  Contralateral limb has appropriate stability  for age and condition  No adenopathy  Pulses are appropriate for current condition  Skin is intact        Chief Complaint    Chief Complaint   Patient presents with    Right Knee - Pain       HPI  Honey Alejandre is a 72 y.o.  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  ADENOMATOUS POLYP.  Diminutive sigmoid polyp and rectal polyp, both SERRATED POLYPS WITH HYPERPLASTIC FEATURES.  Extensive sigmoid and descending diverticulosis.  Internal hemorrhoids.    DILATION AND CURETTAGE OF UTERUS      EYE SURGERY      left, after trauma (facial)    FLEXIBLE SIGMOIDOSCOPY  1987  [facility]    HERNIA REPAIR      right inguinal    KNEE SURGERY      s/p orbital fracture repair --OS      TONSILLECTOMY      and adenoids    UMBILICAL HERNIA REPAIR         Medications  Current Outpatient Prescriptions   Medication Sig    aspirin 325 MG tablet Take 1 tablet (325 mg total) by mouth 2 (two) times daily. (Patient taking differently: Take 325 mg by mouth once daily. )    B-complex with vitamin C (Z-BEC OR EQUIV) tablet Take 1 tablet by mouth once daily.    cholecalciferol, vitamin D3, (VITAMIN D) 1,000 unit capsule Take 1,000 Units by mouth once daily. Every day    losartan (COZAAR) 50 MG tablet TAKE 1 TABLET EVERY DAY    multivitamin-minerals-lutein (SPECTRAVITE SENIOR W-LYCOPENE) Tab Take 1 tablet by mouth once daily. Every morning    pravastatin (PRAVACHOL) 40 MG tablet TAKE 1 TABLET EVERY DAY     No current facility-administered medications for this visit.        Allergies  Review of patient's allergies indicates:   Allergen Reactions    Morphine      Other reaction(s): Nausea       Family History  Family History   Problem Relation Age of Onset    Colon cancer Mother     Thyroid disease Mother     Cancer Mother      colon    Stroke Father     Coronary artery disease Father      1st MI at 52 years old    Lung cancer Sister      oat cell    Breast cancer Sister     Arthritis Brother     Cancer Sister      lung cancer (Oat  Cell carcinoma)    Breast cancer       pat GM, sister, cousin       Social History  Social History     Social History    Marital status:      Spouse name: N/A    Number of children: 1    Years of education: N/A     Occupational History    retired banking;      Social History Main Topics    Smoking status: Former Smoker     Types: Cigarettes    Smokeless tobacco: Never Used      Comment: quit tobacco 35 years ago    Alcohol use No    Drug use: No    Sexual activity: Not Currently     Partners: Male     Other Topics Concern    Not on file     Social History Narrative    No narrative on file               Review of Systems     Constitutional: Negative    HENT: Negative  Eyes: Negative  Respiratory: Negative  Cardiovascular: Negative  Musculoskeletal: HPI  Skin: Negative  Neurological: Negative  Hematological: Negative  Endocrine: Negative                 Physical Exam    There were no vitals filed for this visit.  Body mass index is 22.48 kg/m².  Physical Examination:     General appearance -  well appearing, and in no distress  Mental status - awake  Neck - supple  Chest -  symmetric air entry  Heart - normal rate   Abdomen - soft      Assessment     1. Chronic pain of right knee    2. Primary osteoarthritis of right knee    3. History of left knee replacement          Plan

## 2018-03-19 NOTE — TELEPHONE ENCOUNTER
Please have pt. Come in and get CBC, CMP, lipid, PT, PTT, urnialysis, CXR, EKG for pre op.  Medicare would not allow the PT,PTT, will discuss at uping appt.

## 2018-03-20 ENCOUNTER — HOSPITAL ENCOUNTER (OUTPATIENT)
Dept: RADIOLOGY | Facility: HOSPITAL | Age: 73
Discharge: HOME | End: 2018-03-20
Attending: INTERNAL MEDICINE
Payer: MEDICARE

## 2018-03-20 ENCOUNTER — CLINICAL SUPPORT (OUTPATIENT)
Dept: CARDIOLOGY | Facility: CLINIC | Age: 73
End: 2018-03-20
Payer: MEDICARE

## 2018-03-20 DIAGNOSIS — Z01.818 PRE-OPERATIVE EXAMINATION FOR INTERNAL MEDICINE: ICD-10-CM

## 2018-03-20 PROCEDURE — 71046 X-RAY EXAM CHEST 2 VIEWS: CPT | Mod: TC,FY,PO

## 2018-03-20 PROCEDURE — 93000 ELECTROCARDIOGRAM COMPLETE: CPT | Mod: S$GLB,,, | Performed by: INTERNAL MEDICINE

## 2018-03-20 PROCEDURE — 71046 X-RAY EXAM CHEST 2 VIEWS: CPT | Mod: 26,,, | Performed by: RADIOLOGY

## 2018-03-21 ENCOUNTER — TELEPHONE (OUTPATIENT)
Dept: INTERNAL MEDICINE | Facility: CLINIC | Age: 73
End: 2018-03-21

## 2018-03-21 ENCOUNTER — PATIENT MESSAGE (OUTPATIENT)
Dept: ORTHOPEDICS | Facility: CLINIC | Age: 73
End: 2018-03-21

## 2018-03-21 ENCOUNTER — TELEPHONE (OUTPATIENT)
Dept: ORTHOPEDICS | Facility: CLINIC | Age: 73
End: 2018-03-21

## 2018-03-21 DIAGNOSIS — Z01.818 PRE-OPERATIVE EXAMINATION FOR INTERNAL MEDICINE: Primary | ICD-10-CM

## 2018-03-21 NOTE — TELEPHONE ENCOUNTER
----- Message from Melina Myers sent at 3/21/2018  3:46 PM CDT -----  Contact: self  Please call patient she has questions about her surgery and the pre-op testing.    [phone]

## 2018-03-21 NOTE — TELEPHONE ENCOUNTER
----- Message from Batsheva Suggs sent at 3/20/2018  4:42 PM CDT -----  Contact: Self  Patient left a voice mail messge today at 2:30 for Giselle to return her call.  Please call.

## 2018-03-21 NOTE — TELEPHONE ENCOUNTER
Do you want these labs repeated before upcoming appt as pt has labs done on yesterday? Please advise. Thanks.

## 2018-03-22 ENCOUNTER — TELEPHONE (OUTPATIENT)
Dept: ORTHOPEDICS | Facility: CLINIC | Age: 73
End: 2018-03-22

## 2018-03-22 NOTE — TELEPHONE ENCOUNTER
I talked with patient and we went over surgery preop. Patient has scheduled preop with Dr. Loznao.

## 2018-03-22 NOTE — TELEPHONE ENCOUNTER
----- Message from Batsheva Suggs sent at 3/22/2018  8:19 AM CDT -----  Contact: Self  Patient called yesterday at 5:04 and 5:08 pm wanting to speak with the nurse.  Please all.

## 2018-03-23 RX ORDER — SODIUM CHLORIDE 0.9 % (FLUSH) 0.9 %
3 SYRINGE (ML) INJECTION
Status: SHIPPED | OUTPATIENT
Start: 2018-03-23

## 2018-04-06 ENCOUNTER — TELEPHONE (OUTPATIENT)
Dept: ORTHOPEDICS | Facility: CLINIC | Age: 73
End: 2018-04-06

## 2018-04-06 NOTE — TELEPHONE ENCOUNTER
----- Message from Sandra Hudson sent at 4/6/2018 11:55 AM CDT -----    Pt  Is calling to  Speak to the   Nurse // please call //[phone]

## 2018-04-09 ENCOUNTER — HOSPITAL ENCOUNTER (OUTPATIENT)
Dept: RADIOLOGY | Facility: HOSPITAL | Age: 73
Discharge: HOME | End: 2018-04-09
Attending: ORTHOPAEDIC SURGERY
Payer: MEDICARE

## 2018-04-09 DIAGNOSIS — G89.29 CHRONIC PAIN OF RIGHT KNEE: ICD-10-CM

## 2018-04-09 DIAGNOSIS — M17.11 PRIMARY OSTEOARTHRITIS OF RIGHT KNEE: ICD-10-CM

## 2018-04-09 DIAGNOSIS — M25.561 CHRONIC PAIN OF RIGHT KNEE: ICD-10-CM

## 2018-04-09 DIAGNOSIS — Z96.652 HISTORY OF LEFT KNEE REPLACEMENT: ICD-10-CM

## 2018-04-09 PROCEDURE — 73721 MRI JNT OF LWR EXTRE W/O DYE: CPT | Mod: 26,RT,, | Performed by: RADIOLOGY

## 2018-04-09 PROCEDURE — 73721 MRI JNT OF LWR EXTRE W/O DYE: CPT | Mod: TC,PO,RT

## 2018-04-09 PROCEDURE — 77073 BONE LENGTH STUDIES: CPT | Mod: 26,,, | Performed by: RADIOLOGY

## 2018-04-09 PROCEDURE — 77073 BONE LENGTH STUDIES: CPT | Mod: TC,FY,PO

## 2018-04-20 ENCOUNTER — LAB VISIT (OUTPATIENT)
Dept: LAB | Facility: HOSPITAL | Age: 73
End: 2018-04-20
Attending: INTERNAL MEDICINE
Payer: MEDICARE

## 2018-04-20 DIAGNOSIS — Z01.818 PRE-OPERATIVE EXAMINATION FOR INTERNAL MEDICINE: ICD-10-CM

## 2018-04-20 LAB
ALBUMIN SERPL BCP-MCNC: 3.5 G/DL
ALP SERPL-CCNC: 67 U/L
ALT SERPL W/O P-5'-P-CCNC: 12 U/L
ANION GAP SERPL CALC-SCNC: 6 MMOL/L
AST SERPL-CCNC: 15 U/L
BASOPHILS # BLD AUTO: 0.02 K/UL
BASOPHILS NFR BLD: 0.4 %
BILIRUB SERPL-MCNC: 0.6 MG/DL
BUN SERPL-MCNC: 15 MG/DL
CALCIUM SERPL-MCNC: 9.4 MG/DL
CHLORIDE SERPL-SCNC: 108 MMOL/L
CO2 SERPL-SCNC: 30 MMOL/L
CREAT SERPL-MCNC: 0.8 MG/DL
DIFFERENTIAL METHOD: ABNORMAL
EOSINOPHIL # BLD AUTO: 0.1 K/UL
EOSINOPHIL NFR BLD: 2.2 %
ERYTHROCYTE [DISTWIDTH] IN BLOOD BY AUTOMATED COUNT: 13.3 %
EST. GFR  (AFRICAN AMERICAN): >60 ML/MIN/1.73 M^2
EST. GFR  (NON AFRICAN AMERICAN): >60 ML/MIN/1.73 M^2
GLUCOSE SERPL-MCNC: 104 MG/DL
HCT VFR BLD AUTO: 43.7 %
HGB BLD-MCNC: 13.9 G/DL
IMM GRANULOCYTES # BLD AUTO: 0 K/UL
IMM GRANULOCYTES NFR BLD AUTO: 0 %
LYMPHOCYTES # BLD AUTO: 2 K/UL
LYMPHOCYTES NFR BLD: 45.2 %
MCH RBC QN AUTO: 30.5 PG
MCHC RBC AUTO-ENTMCNC: 31.8 G/DL
MCV RBC AUTO: 96 FL
MONOCYTES # BLD AUTO: 0.4 K/UL
MONOCYTES NFR BLD: 9.8 %
NEUTROPHILS # BLD AUTO: 1.9 K/UL
NEUTROPHILS NFR BLD: 42.4 %
NRBC BLD-RTO: 0 /100 WBC
PLATELET # BLD AUTO: 229 K/UL
PMV BLD AUTO: 9.3 FL
POTASSIUM SERPL-SCNC: 5 MMOL/L
PROT SERPL-MCNC: 6.6 G/DL
RBC # BLD AUTO: 4.56 M/UL
SODIUM SERPL-SCNC: 144 MMOL/L
WBC # BLD AUTO: 4.49 K/UL

## 2018-04-20 PROCEDURE — 85025 COMPLETE CBC W/AUTO DIFF WBC: CPT

## 2018-04-20 PROCEDURE — 80053 COMPREHEN METABOLIC PANEL: CPT

## 2018-04-20 PROCEDURE — 36415 COLL VENOUS BLD VENIPUNCTURE: CPT | Mod: PO

## 2018-04-22 NOTE — PROGRESS NOTES
"HISTORY OF PRESENT ILLNESS:  Pt. is a 72 y.o. female presents for pre op of right TKR with Dr. Diamond, she had previously been a pt. Of Dr. Betito Kumar.  CBC, CMP, urinalysis are all WNL.  CXR is WNL.  EKG shows:    "Vent. Rate : 070 BPM     Atrial Rate : 070 BPM     P-R Int : 176 ms          QRS Dur : 098 ms      QT Int : 404 ms       P-R-T Axes : 066 -53 019 degrees     QTc Int : 436 ms    Normal sinus rhythm  Left axis deviation  Abnormal ECG  When compared with ECG of 01-AUG-2017 15:45,  No significant change was found  Confirmed by MAYELA ANTHONY MD ([id number]) on 3/21/2018 12:47:13 PM"    Health Maintenance Topics with due status: Not Due       Topic Last Completion Date    Colonoscopy 07/24/2013    TETANUS VACCINE 07/19/2016    Mammogram 07/18/2017    DEXA SCAN 07/19/2017    Lipid Panel 03/20/2018    High Dose Statin 03/21/2018     There are no preventive care reminders to display for this patient.    Lab Results   Component Value Date    WBC 4.49 04/20/2018    HGB 13.9 04/20/2018    HCT 43.7 04/20/2018     04/20/2018    CHOL 216 (H) 03/20/2018    TRIG 114 03/20/2018    HDL 61 03/20/2018    LDLCALC 132.2 03/20/2018    ALT 12 04/20/2018    AST 15 04/20/2018     04/20/2018    K 5.0 04/20/2018     04/20/2018    CREATININE 0.8 04/20/2018    BUN 15 04/20/2018    CO2 30 (H) 04/20/2018    ALBUMIN 3.5 04/20/2018    TSH 1.491 12/13/2016    INR 0.9 08/01/2017    HGBA1C 5.8 07/27/2012       Past Medical History:   Diagnosis Date    Arthritis     knees;    CAD (coronary artery disease)     lad, 2008    Cataract     OU    Colon polyps     Foot fracture     bilateral    Hyperlipidemia     Hypertension     Hyperthyroidism     in past in childhood    Lateral meniscal tear     Low vision, left eye, normal vision right eye     due to blood clot/ stroke behind Left eye    Seasonal allergies     Uses roller walker     UTI (urinary tract infection) 08/2017       Past Surgical History:   Procedure " Laterality Date    CAROTID STENT      COLONOSCOPY W/ POLYPECTOMY  8/28/2007  [facility]    3 mm transverse colon polyp.  ADENOMATOUS POLYP.  Diminutive sigmoid polyp and rectal polyp, both SERRATED POLYPS WITH HYPERPLASTIC FEATURES.  Extensive sigmoid and descending diverticulosis.  Internal hemorrhoids.    DILATION AND CURETTAGE OF UTERUS      EYE SURGERY      left, after trauma (facial)    FLEXIBLE SIGMOIDOSCOPY  1987  [facility]    HERNIA REPAIR      right inguinal    KNEE SURGERY      s/p orbital fracture repair --OS      TONSILLECTOMY      and adenoids    UMBILICAL HERNIA REPAIR         Social History     Social History    Marital status:      Spouse name: N/A    Number of children: 1    Years of education: N/A     Occupational History    retired banking;      Social History Main Topics    Smoking status: Former Smoker     Types: Cigarettes    Smokeless tobacco: Never Used      Comment: quit tobacco 35 years ago    Alcohol use No    Drug use: No    Sexual activity: Not Currently     Partners: Male     Other Topics Concern    Not on file     Social History Narrative    No narrative on file       ROS:  GENERAL: No fever, chills, fatigability or weight loss.  SKIN: No rashes, itching or changes in color or texture of skin; easy bruising, has stopped her aspirin;  HEAD: No headaches or recent head trauma.  EARS: Denies ear pain, discharge or vertigo.  NOSE: No loss of smell, no epistaxis or postnasal drip.  MOUTH & THROAT: No hoarseness or change in voice. No excessive gum bleeding.  NODES: Denies swollen glands.  CHEST: Denies BRUNO, cyanosis, wheezing, cough and sputum production.  CARDIOVASCULAR: Denies chest pain, PND, orthopnea or reduced exercise tolerance.  ABDOMEN: Appetite fine. No weight loss. Denies constipation, diarrhea, abdominal pain, hematemesis or blood in stool.  URINARY: No flank pain, dysuria or hematuria.  PERIPHERAL VASCULAR: No claudication or cyanosis. No  edema.  MUSCULOSKELETAL: Positive joint stiffness to right knee; no swelling. Denies back pain.  NEUROLOGIC: Denies numbness    PE:   Vitals:   Vitals:    04/24/18 1316   BP: 122/62   Pulse: 78   Resp: 15   Temp: 98.5 °F (36.9 °C)     GENERAL: no acute distress, A&Ox3, comfortable.  Female with BMI of 22   HEENT: tympanic membranes clear, nasal mucosa pink, no pharyngeal erythema or exudate  NECK: supple, no cervical lymphadenopathy, no thyromegaly; no supraclavicular nodes;   CHEST:  clear to auscultation bilaterally, no crackles or wheeze; no increased work of breathing;  CARDIOVASCULAR: regular rate and rhythm, no rubs, murmurs or gallops.  ABDOMEN: normal bowel sounds, soft non-tender, non-distended; no palpable organomegaly;   EXT: no clubbing, cyanosis or edema.     ASSESSMENT/PLAN:    Pre op right TKR: with Dr. Diamond; pt. Is cleared for procedure;    Coronary artery disease, angina presence unspecified, unspecified vessel or lesion type, unspecified whether native or transplanted heart  -     Ambulatory referral to Cardiology    Aortic ectasia  -     US Abdomen Complete; Future; Expected date: 04/24/2018            Medication List with Changes/Refills   Current Medications    ASPIRIN 325 MG TABLET    Take 1 tablet (325 mg total) by mouth 2 (two) times daily.    B-COMPLEX WITH VITAMIN C (Z-BEC OR EQUIV) TABLET    Take 1 tablet by mouth once daily.    CHOLECALCIFEROL, VITAMIN D3, (VITAMIN D) 1,000 UNIT CAPSULE    Take 1,000 Units by mouth once daily. Every day    LOSARTAN (COZAAR) 50 MG TABLET    TAKE 1 TABLET EVERY DAY    MULTIVITAMIN-MINERALS-LUTEIN (SPECTRAVITE SENIOR W-LYCOPENE) TAB    Take 1 tablet by mouth once daily. Every morning    PRAVASTATIN (PRAVACHOL) 40 MG TABLET    TAKE 1 TABLET EVERY DAY     Call if condition changes or worsens.  Answers for HPI/ROS submitted by the patient on 4/23/2018   activity change: No  unexpected weight change: No  neck pain: No  hearing loss: No  rhinorrhea:  No  trouble swallowing: No  eye discharge: No  visual disturbance: No  chest tightness: No  wheezing: No  chest pain: No  palpitations: No  blood in stool: No  constipation: No  vomiting: No  diarrhea: No  polydipsia: No  polyuria: No  difficulty urinating: No  hematuria: No  menstrual problem: No  dysuria: No  joint swelling: No  arthralgias: No  headaches: No  weakness: No  confusion: No  dysphoric mood: No

## 2018-04-24 ENCOUNTER — OFFICE VISIT (OUTPATIENT)
Dept: INTERNAL MEDICINE | Facility: CLINIC | Age: 73
End: 2018-04-24
Payer: MEDICARE

## 2018-04-24 VITALS
HEIGHT: 61 IN | DIASTOLIC BLOOD PRESSURE: 62 MMHG | OXYGEN SATURATION: 98 % | HEART RATE: 78 BPM | SYSTOLIC BLOOD PRESSURE: 122 MMHG | TEMPERATURE: 99 F | BODY MASS INDEX: 22.43 KG/M2 | RESPIRATION RATE: 15 BRPM | WEIGHT: 118.81 LBS

## 2018-04-24 DIAGNOSIS — M17.11 PRIMARY OSTEOARTHRITIS OF RIGHT KNEE: ICD-10-CM

## 2018-04-24 DIAGNOSIS — Z01.818 PRE-OPERATIVE EXAMINATION FOR INTERNAL MEDICINE: ICD-10-CM

## 2018-04-24 DIAGNOSIS — I25.10 CORONARY ARTERY DISEASE, ANGINA PRESENCE UNSPECIFIED, UNSPECIFIED VESSEL OR LESION TYPE, UNSPECIFIED WHETHER NATIVE OR TRANSPLANTED HEART: ICD-10-CM

## 2018-04-24 DIAGNOSIS — I77.819 AORTIC ECTASIA: ICD-10-CM

## 2018-04-24 PROCEDURE — 99999 PR PBB SHADOW E&M-EST. PATIENT-LVL IV: CPT | Mod: PBBFAC,,, | Performed by: INTERNAL MEDICINE

## 2018-04-24 PROCEDURE — 3078F DIAST BP <80 MM HG: CPT | Mod: CPTII,S$GLB,, | Performed by: INTERNAL MEDICINE

## 2018-04-24 PROCEDURE — 99499 UNLISTED E&M SERVICE: CPT | Mod: S$GLB,,, | Performed by: INTERNAL MEDICINE

## 2018-04-24 PROCEDURE — 99214 OFFICE O/P EST MOD 30 MIN: CPT | Mod: S$GLB,,, | Performed by: INTERNAL MEDICINE

## 2018-04-24 PROCEDURE — 3074F SYST BP LT 130 MM HG: CPT | Mod: CPTII,S$GLB,, | Performed by: INTERNAL MEDICINE

## 2018-04-30 ENCOUNTER — HOSPITAL ENCOUNTER (OUTPATIENT)
Dept: PREADMISSION TESTING | Facility: HOSPITAL | Age: 73
Discharge: HOME | End: 2018-04-30
Attending: INTERNAL MEDICINE
Payer: MEDICARE

## 2018-04-30 ENCOUNTER — PATIENT MESSAGE (OUTPATIENT)
Dept: ADMINISTRATIVE | Facility: OTHER | Age: 73
End: 2018-04-30

## 2018-04-30 NOTE — PRE ADMISSION SCREENING
"               [facility] Risk Assessment Scale    Patient Name: Honey Alejandre  YOB: 1945  MRN: [id number]            RIsk Factor Measure Recommendation Patient Data Scale/Score   BMI >40 Reconsider surgery, weight loss   Estimated body mass index is 22.3 kg/m² as calculated from the following:    Height as of an earlier encounter on 4/30/18: 5' 1" (1.549 m).    Weight as of an earlier encounter on 4/30/18: 53.5 kg (118 lb).   [x] 0 = 1 - 24.9  [] 1 = 25-29.9  [] 2 = 30-34.9  [] 3 = 35-39.9  [] 4 = 40-44.9  [] 5 = 45-99.9   Hemoglobin AIC (if applicable) >9 Delay surgery until DM under control  Refer for:  · Nutrition Therapy  · Exercise   · Medication    Lab Results   Component Value Date    HGBA1C 5.8 07/27/2012       Lab Results   Component Value Date     04/20/2018      [x] 0 = 4.0-5.6  [] 1 = 5.7-6.4  [] 2 = 6.5-6.9  [] 3 = 7.0-7.9  [] 4 = 8.0-8.9  [] 5 = 9.0-12   Hemoglobin (Anemia) <9 Delay surgery   Correct anemia Lab Results   Component Value Date    HGB 13.9 04/20/2018    [] 20 - <9.0                    Albumin <3 Delay surgery &Workup Lab Results   Component Value Date    ALBUMIN 3.5 04/20/2018    [] 20 - <3.0   Smoking Cessation >4 Weeks Delay Surgery  Refer to OP Cessation Class    Never Smoker [] 20 - current smoker                                _____ PPD                    Hx of MI, PE, Arrhythmia, CVA, DVT <30 Days Delay Surgery    N/A [] 20      Infection Variable Delay surgery and re-evaluate   N/A [] 20 - recent/current infection     Depression (PHQ) >10 out of 27 Delay Surgery and re-evaluate  Medication  Counseling              [x] 0     []1     []2     []3      []4      [] 5                    (1-4)      (5-9)  (10-14)  (15-19)   (20-27)     Memory Impairment & Memory loss (Mini-Cog Screening Tool) Advanced dementia and/or Parkinson's Reconsider surgery     [x] 0     []1     []2     []3     []4     [] 5     Physical Conditioning (Modified AM-PAC Per Physical Therapy at Joint " Camp) Unable to ambulate on day of surgery Delay surgery and re-evaluate  Pre-Rehabilitation   (PT evaluation)       []  0   [x]4       []8     []12        []16     []20       (<20%)   (<40%)   (<60%)   (<80% )    (>80%)     Home Environment/Caregiver support  (Per /Navigator Interview)    Availability of basic services and/or approprate assistance during post-operative period Delay surgery and re-evaluate  Safe home environment  Health   1 week post-surgery  Transportation  availability  Ability to obtain DME/Medications post-op    [x] 0     []1     []2     []3     []4     [] 5  [x] 0     []1     []2     []3     []4     [] 5  [x] 0     []1     []2     []3     []4     [] 5  [x] 0     []1     []2     []3     []4     [] 5         MD Contact: Dr. Diamond Comments:  Total Score:  4

## 2018-04-30 NOTE — PRE ADMISSION SCREENING
Patient Name: Honey Alejandre  YOB: 1945   MRN: [id number]     [facility]   Basic Mobility Inpatient Short Form 6 Clicks         How much difficulty does the patient currently have  Unable  A Lot  A Little  None      1. Turning over in bed (including adjusting bedclothes, sheets and blankets)?     1 []    2 []    3 [x]    4 []        2. Sitting down on and standing up from a chair with arms (e.g., wheelchair, bedside commode, etc.)     1 []  2 []  3 []     4 [x]      3. Moving from lying on back to sitting on the side of the bed?     1 []  2 []  3 []    4 [x]    How much help from another person does the patient currently need  Total  A Lot  A Little  None      4. Moving to and from a bed to a chair (including a wheelchair)?    1 []  2 []  3 []    4 [x]      5. Need to walk in hospital room?    1 []  2 []  3 []    4 [x]      6. Climbing 3-5 steps with a railing?    1 []  2 []  3 [x]    4 []       Raw Score:      22            CMS 0-100% Score:    20.91        %   Standardized Score:   53.28            CMS Modifier:       CJ                                   [facility]   Basic Mobility Inpatient Short Form 6 Clicks Score Conversion Table*         *Use this form to convert -PAC Basic Mobility Inpatient Raw Scores.   -[facility] Inpatient Basic Mobility Short Form Scoring Example   1. Add the number values associated with the response to each item. For example, items totals yield a Raw Score of 21.   2. Match the raw score to the t-Scale scores (t-Scale score = 50.25, SE = 4.69).   3. Find the associated CMS % (CMS % = 28.97%).   4. Locate the correct CMS Functional Modifier Code, or G Code (G code = CJ)     NOTE: Each -PAC Short Form has a separate conversion table. Make sure that you use the correct conversion table.       Instruction Manual - page 45 contains conversion table

## 2018-04-30 NOTE — PRE ADMISSION SCREENING
JOINT CAMP ASSESSMENT    Name Honey Alejandre   MRN [id number]    Age/Sex 72 y.o. female    Surgeon Dr. Javier Diamond   Joint Camp Date 4/30/2018   Surgery Date 5/15/2018   Procedure Right Knee Arthroplasty   Insurance Payor: [employer] MEDICARE / Plan: [employer] MEDICARE HMO / Product Type: Capitation /    Care Team Patient Care Team:  Mickie Lozano MD as PCP - General (Internal Medicine)  BECK Caicedo OD as Consulting Physician (Optometry)  Chandler Flores MD as Consulting Physician (Cardiology)    Pharmacy   [facility] - [place], [place] - [address]  [address] [address]  Phone: [phone] Fax: [phone]    [id number] - [place] - [address]  [address]  [facility] [address]  Phone: [phone] Fax: [phone]    [facility] - [place] - [address]  [address]  [facility] [address]  Phone: [phone] Fax: [phone]    [id number] - [place] - [address] AT [facility]  [address]  [facility] [address]  Phone: [phone] Fax: [phone]     AM-PAC Score   22   Risk Assessment Score 4     Past Medical History:   Diagnosis Date    Arthritis     knees;    CAD (coronary artery disease)     lad, 2008    Cataract     OU    Colon polyps     Foot fracture     bilateral    Hyperlipidemia     Hypertension     Hyperthyroidism     in past in childhood    Lateral meniscal tear     Low vision, left eye, normal vision right eye     due to blood clot/ stroke behind Left eye    Seasonal allergies     Uses roller walker     UTI (urinary tract infection) 08/2017       Past Surgical History:   Procedure Laterality Date    addenoids      CAROTID STENT      COLONOSCOPY W/ POLYPECTOMY  8/28/2007  [facility]    3 mm transverse colon polyp.  ADENOMATOUS POLYP.  Diminutive sigmoid polyp and rectal polyp, both SERRATED POLYPS WITH HYPERPLASTIC FEATURES.   Extensive sigmoid and descending diverticulosis.  Internal hemorrhoids.    DILATION AND CURETTAGE OF UTERUS      EYE SURGERY      left, after trauma (facial)    FLEXIBLE SIG[facility]OSCOPY  1987  [facility]    HERNIA REPAIR      right inguinal    KNEE SURGERY      s/p orbital fracture repair --OS      TONSILLECTOMY      and adenoids    UMBILICAL HERNIA REPAIR      VAGINAL DELIVERY           Home Enviroment     Living Arrangement: Lives with spouse but staying at daughters home after surgery for 2 weeks.  Home Environment: 2-story house, number of outside stairs: 1, can live on one level, walk-in shower  Home Safety Concerns: Pets in the home: dogs (1).    DISCHARGE CAREGIVER/SUPPORT SYSTEM     Identified post-op caregiver: Patient has spouse / significant other and children / family / friends to help, daughter, son and grandchildren.  Patient's caregiver(s) will be able to provide physical assistance. Patient will have someone to assist overnight.      Caregiver present at pre-op interview:  Yes      PRE-OPERATIVE FUNCTIONAL STATUS     Employment: Retired    Pre-op Functional Status: Patient is independent with mobility/ambulation, transfers, ADL's, IADL's.    Use of assistive device for ambulation: none  ADL: self care  ADL Limitations: none  Medical Restrictions: Decreased range of motions in extremities    POTENTIAL BARRIERS TO DISCHARGE/POTENTIAL POST-OP COMPLICATIONS     Patient and spouse staying with daughter for 2 weeks after surgery. When patient returns home, she has 7 steps to enter her house. Patient will need stair training with physical therapy.      DISCHARGE PLAN     Expected LOS of 2 days or less for joint replacement discussed with patient. We also discussed a discharge path of  for approximately two weeks with a transition to outpatient PT on the third week given no post-op complications.      Patient in agreement with discharge plan: Yes    Discharge to: Discharge home with [facility]  (PT/OT) x2 weeks with transition to outpatient PT. Patient will have nursing for the full 2 weeks and PT for 1 week before transitionong to outpatient physical therapy.     HH: [facility]      OP PT: [facility]     Home DME: rolling walker, shower chair and quad cane     Needed DME at D/C: none     Rx: Per Dr. Diamond at discharge.     Meds to Beds: N/A  Patient expected to discharge on Coumadin ( Warfarin) for DVT prophylaxis. Coumadin Clinic Needed: Yes  Location: [place]

## 2018-05-02 ENCOUNTER — DOCUMENTATION ONLY (OUTPATIENT)
Dept: ORTHOPEDICS | Facility: CLINIC | Age: 73
End: 2018-05-02

## 2018-05-02 NOTE — PROGRESS NOTES
Joint Camp Assessment Update:    Patient will be staying at daughter's home for 2 weeks after surgery. Address: [address] [place] [address] [place] [address]

## 2018-05-10 DIAGNOSIS — M25.562 ACUTE PAIN OF BOTH KNEES: Primary | ICD-10-CM

## 2018-05-10 DIAGNOSIS — M25.561 ACUTE PAIN OF BOTH KNEES: Primary | ICD-10-CM

## 2018-05-11 DIAGNOSIS — Z96.651 STATUS POST RIGHT KNEE REPLACEMENT: Primary | ICD-10-CM

## 2018-05-14 ENCOUNTER — ANESTHESIA EVENT (OUTPATIENT)
Dept: SURGERY | Facility: HOSPITAL | Age: 73
End: 2018-05-14
Payer: MEDICARE

## 2018-05-14 DIAGNOSIS — Z96.651 STATUS POST RIGHT KNEE REPLACEMENT: Primary | ICD-10-CM

## 2018-05-14 DIAGNOSIS — Z96.651 S/P TOTAL KNEE ARTHROPLASTY, RIGHT: Primary | ICD-10-CM

## 2018-05-14 PROBLEM — Z79.01 PROPHYLACTIC USE OF WARFARIN FOR VENOUS THROMBOEMBOLISM: Status: ACTIVE | Noted: 2018-05-14

## 2018-05-14 RX ORDER — OXYCODONE AND ACETAMINOPHEN 5; 325 MG/1; MG/1
1 TABLET ORAL
Qty: 42 TABLET | Refills: 0 | Status: SHIPPED | OUTPATIENT
Start: 2018-05-14 | End: 2018-08-06 | Stop reason: ALTCHOICE

## 2018-05-14 RX ORDER — WARFARIN SODIUM 5 MG/1
5 TABLET ORAL DAILY
Qty: 30 TABLET | Refills: 1
Start: 2018-05-14 | End: 2018-08-06 | Stop reason: ALTCHOICE

## 2018-05-14 RX ORDER — CEPHALEXIN 500 MG/1
500 CAPSULE ORAL 4 TIMES DAILY
Qty: 20 CAPSULE | Refills: 0 | Status: SHIPPED | OUTPATIENT
Start: 2018-05-14 | End: 2018-08-06 | Stop reason: ALTCHOICE

## 2018-05-14 NOTE — TELEPHONE ENCOUNTER
----- Message from Babita Poe sent at 5/14/2018 11:30 AM CDT -----  Type:  Patient Returning Call    Who Called:  patient  Who Left Message for Patient:  Francine  Does the patient know what this is regarding?:  Barbara  Best Call Back Number: [phone] (home)     Additional Information:  Barbara

## 2018-05-15 ENCOUNTER — SURGERY (OUTPATIENT)
Age: 73
End: 2018-05-15

## 2018-05-15 ENCOUNTER — HOSPITAL ENCOUNTER (OUTPATIENT)
Dept: RADIOLOGY | Facility: HOSPITAL | Age: 73
Discharge: HOME | End: 2018-05-15
Attending: ORTHOPAEDIC SURGERY | Admitting: ORTHOPAEDIC SURGERY
Payer: MEDICARE

## 2018-05-15 ENCOUNTER — CLINICAL SUPPORT (OUTPATIENT)
Dept: REHABILITATION | Facility: HOSPITAL | Age: 73
End: 2018-05-15
Payer: MEDICARE

## 2018-05-15 ENCOUNTER — HOSPITAL ENCOUNTER (OUTPATIENT)
Facility: HOSPITAL | Age: 73
Discharge: HOME | End: 2018-05-15
Attending: ORTHOPAEDIC SURGERY | Admitting: ORTHOPAEDIC SURGERY
Payer: MEDICARE

## 2018-05-15 ENCOUNTER — ANESTHESIA (OUTPATIENT)
Dept: SURGERY | Facility: HOSPITAL | Age: 73
End: 2018-05-15
Payer: MEDICARE

## 2018-05-15 DIAGNOSIS — M17.11 PRIMARY OSTEOARTHRITIS OF RIGHT KNEE: Primary | ICD-10-CM

## 2018-05-15 DIAGNOSIS — M17.11 RIGHT KNEE DJD: ICD-10-CM

## 2018-05-15 DIAGNOSIS — M25.561 CHRONIC PAIN OF RIGHT KNEE: ICD-10-CM

## 2018-05-15 DIAGNOSIS — G89.29 CHRONIC PAIN OF RIGHT KNEE: ICD-10-CM

## 2018-05-15 DIAGNOSIS — Z96.651 S/P TOTAL KNEE ARTHROPLASTY, RIGHT: Primary | ICD-10-CM

## 2018-05-15 PROCEDURE — G8979 MOBILITY GOAL STATUS: HCPCS | Mod: CL,PO | Performed by: PHYSICAL THERAPIST

## 2018-05-15 PROCEDURE — 25000003 PHARM REV CODE 250: Mod: PO | Performed by: ORTHOPAEDIC SURGERY

## 2018-05-15 PROCEDURE — C1776 JOINT DEVICE (IMPLANTABLE): HCPCS | Mod: PO | Performed by: ORTHOPAEDIC SURGERY

## 2018-05-15 PROCEDURE — 63600175 PHARM REV CODE 636 W HCPCS: Mod: PO | Performed by: ORTHOPAEDIC SURGERY

## 2018-05-15 PROCEDURE — G8978 MOBILITY CURRENT STATUS: HCPCS | Mod: CL,PO | Performed by: PHYSICAL THERAPIST

## 2018-05-15 PROCEDURE — 63600175 PHARM REV CODE 636 W HCPCS: Mod: PO | Performed by: NURSE ANESTHETIST, CERTIFIED REGISTERED

## 2018-05-15 PROCEDURE — 71000033 HC RECOVERY, INTIAL HOUR: Mod: PO | Performed by: ORTHOPAEDIC SURGERY

## 2018-05-15 PROCEDURE — 73560 X-RAY EXAM OF KNEE 1 OR 2: CPT | Mod: 26,RT,, | Performed by: RADIOLOGY

## 2018-05-15 PROCEDURE — D9220A PRA ANESTHESIA: Mod: CRNA,,, | Performed by: NURSE ANESTHETIST, CERTIFIED REGISTERED

## 2018-05-15 PROCEDURE — 63600175 PHARM REV CODE 636 W HCPCS: Mod: PO | Performed by: ANESTHESIOLOGY

## 2018-05-15 PROCEDURE — 36000710: Mod: PO | Performed by: ORTHOPAEDIC SURGERY

## 2018-05-15 PROCEDURE — 73560 X-RAY EXAM OF KNEE 1 OR 2: CPT | Mod: TC,FY,PO,RT

## 2018-05-15 PROCEDURE — 64448 NJX AA&/STRD FEM NRV NFS IMG: CPT | Mod: 59,RT,, | Performed by: ANESTHESIOLOGY

## 2018-05-15 PROCEDURE — G8980 MOBILITY D/C STATUS: HCPCS | Mod: CL,PO | Performed by: PHYSICAL THERAPIST

## 2018-05-15 PROCEDURE — 76942 ECHO GUIDE FOR BIOPSY: CPT | Mod: 26,,, | Performed by: ANESTHESIOLOGY

## 2018-05-15 PROCEDURE — 71000039 HC RECOVERY, EACH ADD'L HOUR: Mod: PO | Performed by: ORTHOPAEDIC SURGERY

## 2018-05-15 PROCEDURE — C1713 ANCHOR/SCREW BN/BN,TIS/BN: HCPCS | Mod: PO | Performed by: ORTHOPAEDIC SURGERY

## 2018-05-15 PROCEDURE — 27200664 HC NERVE BLOCK COMPLETE KIT: Mod: PO | Performed by: ANESTHESIOLOGY

## 2018-05-15 PROCEDURE — 36000711: Mod: PO | Performed by: ORTHOPAEDIC SURGERY

## 2018-05-15 PROCEDURE — 64448 NJX AA&/STRD FEM NRV NFS IMG: CPT | Mod: PO | Performed by: ANESTHESIOLOGY

## 2018-05-15 PROCEDURE — 27201423 OPTIME MED/SURG SUP & DEVICES STERILE SUPPLY: Mod: PO | Performed by: ORTHOPAEDIC SURGERY

## 2018-05-15 PROCEDURE — 25000003 PHARM REV CODE 250: Mod: PO | Performed by: ANESTHESIOLOGY

## 2018-05-15 PROCEDURE — D9220A PRA ANESTHESIA: Mod: ANES,,, | Performed by: ANESTHESIOLOGY

## 2018-05-15 PROCEDURE — 37000009 HC ANESTHESIA EA ADD 15 MINS: Mod: PO | Performed by: ORTHOPAEDIC SURGERY

## 2018-05-15 PROCEDURE — 97162 PT EVAL MOD COMPLEX 30 MIN: CPT | Mod: PO | Performed by: PHYSICAL THERAPIST

## 2018-05-15 PROCEDURE — 27447 TOTAL KNEE ARTHROPLASTY: CPT | Mod: RT,,, | Performed by: ORTHOPAEDIC SURGERY

## 2018-05-15 PROCEDURE — 37000008 HC ANESTHESIA 1ST 15 MINUTES: Mod: PO | Performed by: ORTHOPAEDIC SURGERY

## 2018-05-15 PROCEDURE — 76942 ECHO GUIDE FOR BIOPSY: CPT | Mod: PO | Performed by: ANESTHESIOLOGY

## 2018-05-15 DEVICE — IMPLANTABLE DEVICE: Type: IMPLANTABLE DEVICE | Site: KNEE | Status: FUNCTIONAL

## 2018-05-15 DEVICE — LUGS FEMORAL GII PRI: Type: IMPLANTABLE DEVICE | Site: KNEE | Status: FUNCTIONAL

## 2018-05-15 DEVICE — CEMENT BONE LV G PALACOS 1X40: Type: IMPLANTABLE DEVICE | Site: KNEE | Status: FUNCTIONAL

## 2018-05-15 RX ORDER — MIDAZOLAM HYDROCHLORIDE 1 MG/ML
INJECTION, SOLUTION INTRAMUSCULAR; INTRAVENOUS
Status: DISCONTINUED | OUTPATIENT
Start: 2018-05-15 | End: 2018-05-15

## 2018-05-15 RX ORDER — CEFAZOLIN SODIUM 1 G/3ML
2 INJECTION, POWDER, FOR SOLUTION INTRAMUSCULAR; INTRAVENOUS
Status: COMPLETED | OUTPATIENT
Start: 2018-05-15 | End: 2018-05-15

## 2018-05-15 RX ORDER — ONDANSETRON 2 MG/ML
4 INJECTION INTRAMUSCULAR; INTRAVENOUS EVERY 4 HOURS PRN
Status: DISCONTINUED | OUTPATIENT
Start: 2018-05-15 | End: 2018-05-15 | Stop reason: HOSPADM

## 2018-05-15 RX ORDER — PREGABALIN 25 MG/1
75 CAPSULE ORAL ONCE
Status: DISCONTINUED | OUTPATIENT
Start: 2018-05-15 | End: 2018-05-15

## 2018-05-15 RX ORDER — OXYCODONE HCL 10 MG/1
10 TABLET, FILM COATED, EXTENDED RELEASE ORAL EVERY 12 HOURS
Status: DISCONTINUED | OUTPATIENT
Start: 2018-05-15 | End: 2018-05-15 | Stop reason: HOSPADM

## 2018-05-15 RX ORDER — ONDANSETRON 2 MG/ML
4 INJECTION INTRAMUSCULAR; INTRAVENOUS EVERY 12 HOURS PRN
Status: DISCONTINUED | OUTPATIENT
Start: 2018-05-15 | End: 2018-05-15 | Stop reason: HOSPADM

## 2018-05-15 RX ORDER — GABAPENTIN 100 MG/1
300 CAPSULE ORAL 3 TIMES DAILY
Status: DISCONTINUED | OUTPATIENT
Start: 2018-05-15 | End: 2018-05-15 | Stop reason: HOSPADM

## 2018-05-15 RX ORDER — ACETAMINOPHEN 10 MG/ML
INJECTION, SOLUTION INTRAVENOUS
Status: DISCONTINUED | OUTPATIENT
Start: 2018-05-15 | End: 2018-05-15

## 2018-05-15 RX ORDER — FENTANYL CITRATE 50 UG/ML
25 INJECTION, SOLUTION INTRAMUSCULAR; INTRAVENOUS EVERY 5 MIN PRN
Status: DISCONTINUED | OUTPATIENT
Start: 2018-05-15 | End: 2018-05-15 | Stop reason: HOSPADM

## 2018-05-15 RX ORDER — LIDOCAINE HCL/PF 100 MG/5ML
SYRINGE (ML) INTRAVENOUS
Status: DISCONTINUED | OUTPATIENT
Start: 2018-05-15 | End: 2018-05-15

## 2018-05-15 RX ORDER — HYDROCODONE BITARTRATE AND ACETAMINOPHEN 5; 325 MG/1; MG/1
1 TABLET ORAL EVERY 4 HOURS PRN
Status: DISCONTINUED | OUTPATIENT
Start: 2018-05-15 | End: 2018-05-15 | Stop reason: HOSPADM

## 2018-05-15 RX ORDER — OXYCODONE HCL 10 MG/1
10 TABLET, FILM COATED, EXTENDED RELEASE ORAL ONCE
Status: COMPLETED | OUTPATIENT
Start: 2018-05-15 | End: 2018-05-15

## 2018-05-15 RX ORDER — LIDOCAINE HYDROCHLORIDE 10 MG/ML
1 INJECTION, SOLUTION EPIDURAL; INFILTRATION; INTRACAUDAL; PERINEURAL ONCE
Status: COMPLETED | OUTPATIENT
Start: 2018-05-15 | End: 2018-05-15

## 2018-05-15 RX ORDER — OXYCODONE HYDROCHLORIDE 5 MG/1
5 TABLET ORAL
Status: DISCONTINUED | OUTPATIENT
Start: 2018-05-15 | End: 2018-05-15 | Stop reason: HOSPADM

## 2018-05-15 RX ORDER — SODIUM CHLORIDE 0.9 G/100ML
IRRIGANT IRRIGATION
Status: DISCONTINUED | OUTPATIENT
Start: 2018-05-15 | End: 2018-05-15 | Stop reason: HOSPADM

## 2018-05-15 RX ORDER — DIPHENHYDRAMINE HYDROCHLORIDE 50 MG/ML
12.5 INJECTION INTRAMUSCULAR; INTRAVENOUS
Status: DISCONTINUED | OUTPATIENT
Start: 2018-05-15 | End: 2018-05-15 | Stop reason: HOSPADM

## 2018-05-15 RX ORDER — CELECOXIB 100 MG/1
200 CAPSULE ORAL ONCE
Status: COMPLETED | OUTPATIENT
Start: 2018-05-15 | End: 2018-05-15

## 2018-05-15 RX ORDER — SODIUM CHLORIDE 9 MG/ML
INJECTION, SOLUTION INTRAVENOUS CONTINUOUS
Status: DISCONTINUED | OUTPATIENT
Start: 2018-05-15 | End: 2018-05-15 | Stop reason: HOSPADM

## 2018-05-15 RX ORDER — SODIUM CHLORIDE, SODIUM LACTATE, POTASSIUM CHLORIDE, CALCIUM CHLORIDE 600; 310; 30; 20 MG/100ML; MG/100ML; MG/100ML; MG/100ML
INJECTION, SOLUTION INTRAVENOUS CONTINUOUS PRN
Status: DISCONTINUED | OUTPATIENT
Start: 2018-05-15 | End: 2018-05-15

## 2018-05-15 RX ORDER — PROPOFOL 10 MG/ML
VIAL (ML) INTRAVENOUS CONTINUOUS PRN
Status: DISCONTINUED | OUTPATIENT
Start: 2018-05-15 | End: 2018-05-15

## 2018-05-15 RX ORDER — ACETAMINOPHEN 10 MG/ML
1000 INJECTION, SOLUTION INTRAVENOUS ONCE
Status: COMPLETED | OUTPATIENT
Start: 2018-05-15 | End: 2018-05-15

## 2018-05-15 RX ORDER — CEFAZOLIN SODIUM 1 G/3ML
1 INJECTION, POWDER, FOR SOLUTION INTRAMUSCULAR; INTRAVENOUS ONCE
Status: COMPLETED | OUTPATIENT
Start: 2018-05-15 | End: 2018-05-15

## 2018-05-15 RX ORDER — FENTANYL CITRATE 50 UG/ML
INJECTION, SOLUTION INTRAMUSCULAR; INTRAVENOUS
Status: DISCONTINUED | OUTPATIENT
Start: 2018-05-15 | End: 2018-05-15

## 2018-05-15 RX ADMIN — SODIUM CHLORIDE 3000 ML: 0.9 IRRIGANT IRRIGATION at 11:05

## 2018-05-15 RX ADMIN — FENTANYL CITRATE 50 MCG: 50 INJECTION, SOLUTION INTRAMUSCULAR; INTRAVENOUS at 10:05

## 2018-05-15 RX ADMIN — PROPOFOL 35 MCG/KG/MIN: 10 INJECTION, EMULSION INTRAVENOUS at 10:05

## 2018-05-15 RX ADMIN — GABAPENTIN 300 MG: 300 CAPSULE ORAL at 03:05

## 2018-05-15 RX ADMIN — CEFAZOLIN 1 G: 1 INJECTION, POWDER, FOR SOLUTION INTRAVENOUS at 03:05

## 2018-05-15 RX ADMIN — MIDAZOLAM HYDROCHLORIDE 1 MG: 1 INJECTION, SOLUTION INTRAMUSCULAR; INTRAVENOUS at 10:05

## 2018-05-15 RX ADMIN — SODIUM CHLORIDE: 0.9 INJECTION, SOLUTION INTRAVENOUS at 09:05

## 2018-05-15 RX ADMIN — CEFAZOLIN 2 G: 1 INJECTION, POWDER, FOR SOLUTION INTRAVENOUS at 10:05

## 2018-05-15 RX ADMIN — CELECOXIB 200 MG: 100 CAPSULE ORAL at 07:05

## 2018-05-15 RX ADMIN — Medication 10 MG: at 07:05

## 2018-05-15 RX ADMIN — SODIUM CHLORIDE, SODIUM LACTATE, POTASSIUM CHLORIDE, CALCIUM CHLORIDE: 600; 310; 30; 20 INJECTION, SOLUTION INTRAVENOUS at 11:05

## 2018-05-15 RX ADMIN — LIDOCAINE HYDROCHLORIDE 50 MG: 20 INJECTION PARENTERAL at 10:05

## 2018-05-15 RX ADMIN — SODIUM CHLORIDE 1000 MG: 9 INJECTION, SOLUTION INTRAVENOUS at 12:05

## 2018-05-15 RX ADMIN — ACETAMINOPHEN 1000 MG: 10 INJECTION, SOLUTION INTRAVENOUS at 11:05

## 2018-05-15 RX ADMIN — FENTANYL CITRATE 25 MCG: 50 INJECTION, SOLUTION INTRAMUSCULAR; INTRAVENOUS at 01:05

## 2018-05-15 RX ADMIN — SODIUM CHLORIDE 1000 MG: 9 INJECTION, SOLUTION INTRAVENOUS at 08:05

## 2018-05-15 RX ADMIN — FENTANYL CITRATE 25 MCG: 50 INJECTION, SOLUTION INTRAMUSCULAR; INTRAVENOUS at 12:05

## 2018-05-15 RX ADMIN — GABAPENTIN 300 MG: 300 CAPSULE ORAL at 07:05

## 2018-05-15 RX ADMIN — ACETAMINOPHEN 1000 MG: 10 INJECTION, SOLUTION INTRAVENOUS at 03:05

## 2018-05-15 RX ADMIN — LIDOCAINE HYDROCHLORIDE: 10 INJECTION, SOLUTION EPIDURAL; INFILTRATION; INTRACAUDAL; PERINEURAL at 08:05

## 2018-05-15 RX ADMIN — SODIUM CHLORIDE 1000 ML: 0.9 INJECTION, SOLUTION INTRAVENOUS at 08:05

## 2018-05-15 RX ADMIN — ONDANSETRON 4 MG: 2 INJECTION INTRAMUSCULAR; INTRAVENOUS at 02:05

## 2018-05-15 RX ADMIN — CELECOXIB 200 MG: 200 CAPSULE ORAL at 04:05

## 2018-05-15 NOTE — ANESTHESIA PREPROCEDURE EVALUATION
05/15/2018  Honey Alejandre is a 72 y.o., female.    Anesthesia Evaluation      I have reviewed the Medications.     Review of Systems  Anesthesia Hx:  No problems with previous Anesthesia   Social:  Non-Smoker, No Alcohol Use    Cardiovascular:   Hypertension CAD  CABG/stent (stent 2008)  hyperlipidemia    Pulmonary:  Pulmonary Normal    Renal/:  Renal/ Normal     Hepatic/GI:  Hepatic/GI Normal    Neurological:  Neurology Normal Possible mild dementia   Endocrine:  Endocrine Normal        Physical Exam  General:  Well nourished    Airway/Jaw/Neck:  Airway Findings: Mouth Opening: Normal General Airway Assessment: Adult  Mallampati: II  Jaw/Neck Findings:  Neck ROM: Extension Decreased, Mild       Chest/Lungs:  Chest/Lungs Findings: Clear to auscultation, Normal Respiratory Rate     Heart/Vascular:  Heart Findings: Rate: Normal  Rhythm: Regular Rhythm  Sounds: Normal  Heart murmur: negative Vascular Findings: Normal (No carotid bruits.)       Mental Status:  Mental Status Findings:  Cooperative, Alert and Oriented         Anesthesia Plan  Type of Anesthesia, risks & benefits discussed:  Anesthesia Type:  spinal  Patient's Preference:   Intra-op Monitoring Plan:   Intra-op Monitoring Plan Comments:   Post Op Pain Control Plan: peripheral nerve block  Post Op Pain Control Plan Comments:   Induction:    Beta Blocker:  Patient is not currently on a Beta-Blocker (No further documentation required).       Informed Consent: Patient understands risks and agrees with Anesthesia plan.  Questions answered. Anesthesia consent signed with patient.  ASA Score: 3     Day of Surgery Review of History & Physical:        Anesthesia Plan Notes: Plan adductor canal perineural catheter placement in PACU        Ready For Surgery From Anesthesia Perspective.

## 2018-05-15 NOTE — PLAN OF CARE
Patient tolerating oral liquids without difficulty. No apparent s&s of distress or nausea noted at this time. Pt states pain tolerable for d/c home. Dressing C,D,I to right knee with polar care in place. PT has home walker with her. Home health notified of pts discharge home. On Q ball infusing without difficulty to right thigh perineural catheter. Discharge instructions reviewed with patient and family with good verbal feedback received. Patient ready for discharge

## 2018-05-15 NOTE — OR NURSING
Dr. Diamond informed of pts complaint of right ankle pain. Dr. Diamond states her ankle was moved around during surgery and that it might be sore. PT update on information. PT now states she feels 0/10 pain to right rosalia but that it feels heavy. Dressing C,D,I. Will monitor closely.

## 2018-05-15 NOTE — OP NOTE
Department of Orthopedic Surgery    Operative Note    DATE: 5/15/2018      PREOPERATIVE DIAGNOSIS: Primary osteoarthritis of right knee [M17.11]  Chronic pain of right knee [M25.561, G89.29]    POSTOPERATIVE DIAGNOSIS:  Primary osteoarthritis of right knee [M17.11]  Chronic pain of right knee [M25.561, G89.29]    PROCEDURE: right Total Knee Arthroplasty    SURGEON: Javier Diamond M.D.    ASSISTANT: Ace Quigley RN CFA     ANESTHESIA: Spinal    BLOOD LOSS: Less than 20 mL    FLUIDS:  Crystalloid    DRAINS: None    INDICATIONS FOR PROCEDURE: @ is a 72 y.o.-year-old with debilitating right-sided knee pain despite nonoperative measures and interested in knee arthroplasty.    PROCEDURE IN DETAIL:  After obtaining informed consent and starting the patient   on preoperative IV antibiotics, the patient was taken back to the Operating   Room. Spinal anesthesia was performed by Anesthesia Team. The right lower  extremity was prepped and draped in normal sterile fashion.  An Esmarch bandage was used to exsanguinate the right lower extremity and the pneumatic tourniquet  was inflated to 300 mmHg.  A standard longitudinal midline incision was made beginning 3 fingerbreadths above the superior pole of the patella extending down to the tibial tubercle.  Full-thickness skin flaps were raised.  A medial parapatellar arthrotomy was made. We everted the  patella, took down the soft tissues off the proximal medial tibia and the distal anterior femur.  We then used a   preoperative navigation patient specific cutting block to make our distal   femoral resection perpendicular to the mechanical axis. We then used our size   4, 4-in-1 cutting block to make our anterior, posterior, and anterior and posterior chamfer cuts.  We then made a box cut with the femoral component.  We then   addressed the tibia, resected the proximal tibia perpendicular to the medial axis using our preoperative navigation patient specific cutting block.  Flexion  and extension gaps were symmetric and balanced at 9.  We then went ahead and punched for a tibia size 2 using preoperative navigation rotational holes.  We then went with the trial components, 4 narrow femur, 2 tibia with a 9 polyethylene spacer.  Had good range of motion and stability.  We then went ahead and resected about 10 mm of bone off the patella to accommodate our 32 patellar button.  Patella tracked nicely.  No lateral release was required.  We then Pulsavac irrigated cancellous bone, vacuum mixed,   antibiotic-impregnated cement, cemented the tibial component, size 2, removed our excess cement, cemented our femoral component, size 4 narrow , and removed excess cement, locked our 9 polyethylene spacer and then cemented our patellar button size 32 and removed excess cement.  Once the cement hardened, we closed the extensor retinaculum with #1 Vicryl figure of eight sutures with the knee in slight flexion, the subcutaneous tissue with 2-0 Vicryl, skin reapproximated with staples.  A sterile dressing was applied.  Tourniquet deflated.

## 2018-05-15 NOTE — H&P
Honey Jeffries 72 years old, complaining of right knee pain, had this now for a   few years' time.  She has failed longstanding nonoperative course, injections,   therapy, bracing, still has pain, 5/10 on good days, 10/10 on bad days.  She did   have a left-sided knee replacement several months ago, responded well.  She is   interested in arthroplasty on the right knee.     Exam shows tenderness at the joint line.  No signs of infection.  No   instability.  Skin is intact.  Compartments are soft.     X-rays show arthritic changes.     ASSESSMENT:  Right knee arthrosis.     PLAN:  We will schedule for a total knee replacement.  She is aware of the risks   and benefits and limitations of surgery, and she still wants to proceed.  We   look forward to seeing her at the time of surgery.        MATHIEU/NYDIA  dd: 03/19/2018 11:05:29 (CDT)  td: 03/20/2018 01:50:10 (CDT)  Doc ID   #[id number]  Job ID #[id number]     CC:      Further History  Aching pain  Worse with activity  Relieved with rest  No other associated symptoms  No other radiation     Further Exam  Alert and oriented  Pleasant  Contralateral limb has appropriate range of motion for age and condition  Contralateral limb has appropriate strength for age and condition  Contralateral limb has appropriate stability  for age and condition  No adenopathy  Pulses are appropriate for current condition  Skin is intact           Chief Complaint         Chief Complaint   Patient presents with    Right Knee - Pain         HPI  Honey Alejandre is a 72 y.o.  female who presents with         Past Medical History       Past Medical History:   Diagnosis Date    Arthritis       knees;    CAD (coronary artery disease)       lad, 2008    Cataract       OU    Colon polyps      Foot fracture       bilateral    Hyperlipidemia      Hypertension      Hyperthyroidism       in past in childhood    Lateral meniscal tear      Low vision, left eye, normal vision right eye       due to blood  clot/ stroke behind Left eye    Seasonal allergies      Uses roller walker      UTI (urinary tract infection) 08/2017         Past Surgical History        Past Surgical History:   Procedure Laterality Date    CAROTID STENT        COLONOSCOPY W/ POLYPECTOMY   8/28/2007  [facility]     3 mm transverse colon polyp.  ADENOMATOUS POLYP.  Diminutive sigmoid polyp and rectal polyp, both SERRATED POLYPS WITH HYPERPLASTIC FEATURES.  Extensive sigmoid and descending diverticulosis.  Internal hemorrhoids.    DILATION AND CURETTAGE OF UTERUS        EYE SURGERY         left, after trauma (facial)    FLEXIBLE SIGMOIDOSCOPY   1987  [facility]    HERNIA REPAIR         right inguinal    KNEE SURGERY        s/p orbital fracture repair --OS        TONSILLECTOMY         and adenoids    UMBILICAL HERNIA REPAIR             Medications       Current Outpatient Prescriptions   Medication Sig    aspirin 325 MG tablet Take 1 tablet (325 mg total) by mouth 2 (two) times daily. (Patient taking differently: Take 325 mg by mouth once daily. )    B-complex with vitamin C (Z-BEC OR EQUIV) tablet Take 1 tablet by mouth once daily.    cholecalciferol, vitamin D3, (VITAMIN D) 1,000 unit capsule Take 1,000 Units by mouth once daily. Every day    losartan (COZAAR) 50 MG tablet TAKE 1 TABLET EVERY DAY    multivitamin-minerals-lutein (SPECTRAVITE SENIOR W-LYCOPENE) Tab Take 1 tablet by mouth once daily. Every morning    pravastatin (PRAVACHOL) 40 MG tablet TAKE 1 TABLET EVERY DAY      No current facility-administered medications for this visit.          Allergies        Review of patient's allergies indicates:   Allergen Reactions    Morphine         Other reaction(s): Nausea         Family History         Family History   Problem Relation Age of Onset    Colon cancer Mother      Thyroid disease Mother      Cancer Mother         colon    Stroke Father      Coronary artery disease Father         1st MI at 52 years old    Lung cancer  Sister         oat cell    Breast cancer Sister      Arthritis Brother      Cancer Sister         lung cancer (Oat Cell carcinoma)    Breast cancer           pat GM, sister, cousin         Social History  Social History            Social History    Marital status:        Spouse name: N/A    Number of children: 1    Years of education: N/A           Occupational History    retired banking;               Social History Main Topics    Smoking status: Former Smoker       Types: Cigarettes    Smokeless tobacco: Never Used         Comment: quit tobacco 35 years ago    Alcohol use No    Drug use: No    Sexual activity: Not Currently       Partners: Male           Other Topics Concern    Not on file          Social History Narrative    No narrative on file                     Review of Systems      Constitutional: Negative    HENT: Negative  Eyes: Negative  Respiratory: Negative  Cardiovascular: Negative  Musculoskeletal: HPI  Skin: Negative  Neurological: Negative  Hematological: Negative  Endocrine: Negative                       Physical Exam     There were no vitals filed for this visit.  Body mass index is 22.48 kg/m².  Physical Examination:     General appearance -  well appearing, and in no distress  Mental status - awake  Neck - supple  Chest -  symmetric air entry  Heart - normal rate   Abdomen - soft        Assessment     1. Chronic pain of right knee    2. Primary osteoarthritis of right knee    3. History of left knee replacement             Plan  TKA

## 2018-05-15 NOTE — TRANSFER OF CARE
"Anesthesia Transfer of Care Note    Patient: Honey Alejandre    Procedure(s) Performed: Procedure(s) (LRB):  ARTHROPLASTY-KNEE (Right)    Patient location: PACU    Anesthesia Type: MAC and regional    Transport from OR: Transported from OR on room air with adequate spontaneous ventilation    Post pain: adequate analgesia    Post assessment: no apparent anesthetic complications    Post vital signs: stable    Level of consciousness: awake    Nausea/Vomiting: no nausea/vomiting    Complications: none    Transfer of care protocol was followed      Last vitals:   Visit Vitals  /67 (BP Location: Right arm)   Pulse 69   Temp 36.6 °C (97.9 °F) (Skin)   Resp 18   Ht 5' 1" (1.549 m)   Wt 53.5 kg (118 lb)   SpO2 99%   Breastfeeding? No   BMI 22.30 kg/m²     "

## 2018-05-15 NOTE — PLAN OF CARE
Vital signs stable. All questions answered. Denies recent fever or illness. Patient states ready for planned procedure.Daughter  And  to bedside.

## 2018-05-15 NOTE — ANESTHESIA PROCEDURE NOTES
Peripheral    Patient location during procedure: pre-op   Block not for primary anesthetic.  Reason for block: at surgeon's request and post-op pain management   Post-op Pain Location: right knee pain  Start time: 5/15/2018 12:50 PM  Timeout: 5/15/2018 12:49 PM   End time: 5/15/2018 12:58 PM  Surgery related to: right knee arthroplasty  Staffing  Anesthesiologist: STEPHY THOMAS  Performed: anesthesiologist   Preanesthetic Checklist  Completed: patient identified, site marked, surgical consent, pre-op evaluation, timeout performed, IV checked, risks and benefits discussed and monitors and equipment checked  Peripheral Block  Patient position: supine  Prep: ChloraPrep and site prepped and draped  Patient monitoring: heart rate, cardiac monitor, continuous pulse ox and frequent blood pressure checks  Block type: adductor canal  Laterality: right  Injection technique: continuous  Needle  Needle type: Tuohy   Needle gauge: 18 G  Needle length: 4 in  Needle localization: ultrasound guidance  Needle insertion depth: 4 cm  Catheter type: non-stimulating  Catheter size: 20 G  Catheter at skin depth: 6 cm  Test dose: lidocaine 1.5% with Epi 1-to-200,000 and negative   -ultrasound image captured on disc.  Assessment  Injection assessment: negative aspiration, negative parasthesia and local visualized surrounding nerve  Paresthesia pain: none  Heart rate change: no  Slow fractionated injection: yes  Medications:  Bolus administered: 30 mL of 0.5 ropivacaine  Epinephrine added: none  Additional Notes  Secured with mastisol and steri-strips. Covered with tegaderm dressing.

## 2018-05-15 NOTE — BRIEF OP NOTE
[facility]  Brief Operative Note     SUMMARY     Surgery Date: 5/15/2018     Surgeon(s) and Role:     * Javier Diamond MD - Primary    Assistant: Ace Quigley RN [facility]    Pre-op Diagnosis:  Primary osteoarthritis of right knee [M17.11]  Chronic pain of right knee [M25.561, G89.29]    Post-op Diagnosis:  Primary osteoarthritis of right knee [M17.11]  Chronic pain of right knee [M25.561, G89.29]    Procedure(s) (LRB):  ARTHROPLASTY-KNEE (Right)      Description of the findings of the procedure:  Primary osteoarthritis of right knee [M17.11]  Chronic pain of right knee [M25.561, G89.29]      Estimated Blood Loss: < 20CC         Specimens:   Specimen (12h ago through future)    None          Discharge Note    SUMMARY     Admit Date: 5/15/2018    Discharge Date and Time: No discharge date for patient encounter.    Attending Physician: Javier Diamond MD     Discharge Provider: Javier Diamond    Final Diagnosis :Primary osteoarthritis of right knee [M17.11]  Chronic pain of right knee [M25.561, G89.29]    Outcome of Hospitalization, Treatment, Procedure, or Surgery:  Patient was admitted for an outpatient procedure and tolerated it well without complications.    Disposition: Home or Self care    Follow Up/Patient Instructions: The patient will be discharged home after meeting all discharge criteria. The patient will resume a diet as tolerated. Please follow post-operative instructions for activity restrictions. Keep previously planned post-operative follow -up appointment.      No discharge procedures on file.

## 2018-05-15 NOTE — ANESTHESIA POSTPROCEDURE EVALUATION
"Anesthesia Post Evaluation    Patient: Honey Alejandre    Procedure(s) Performed: Procedure(s) (LRB):  ARTHROPLASTY-KNEE (Right)    Final Anesthesia Type: spinal  Patient location during evaluation: PACU  Patient participation: Yes- Able to Participate  Level of consciousness: awake and alert  Post-procedure vital signs: reviewed and stable  Pain management: adequate  Airway patency: patent  PONV status at discharge: No PONV  Anesthetic complications: no      Cardiovascular status: blood pressure returned to baseline and hemodynamically stable  Respiratory status: unassisted, spontaneous ventilation and room air  Hydration status: euvolemic  Follow-up not needed.        Visit Vitals  BP (!) 113/55   Pulse 61   Temp 36.2 °C (97.2 °F) (Skin)   Resp 14   Ht 5' 1" (1.549 m)   Wt 53.5 kg (118 lb)   SpO2 96%   Breastfeeding? No   BMI 22.30 kg/m²       Pain/Jorge Score: Pain Assessment Performed: Yes (5/15/2018  1:00 PM)  Presence of Pain: complains of pain/discomfort (5/15/2018  1:17 PM)  Pain Rating Prior to Med Admin: 2 (5/15/2018  1:34 PM)  Jorge Score: 9 (5/15/2018  1:00 PM)      "

## 2018-05-15 NOTE — PLAN OF CARE
"  TIME RECORD    Date: 05/15/2018    Start Time:  315  Stop Time:  335        OUTPATIENT PHYSICAL THERAPY   PATIENT EVALUATION  Onset Date: DOS 5/15/18  Primary Diagnosis:   1. S/P total knee arthroplasty, right       Treatment Diagnosis: gait abnormality s/p R TKA  Past Medical History:   Diagnosis Date    Arthritis     knees;    CAD (coronary artery disease)     lad, 2008    Cataract     OU    Colon polyps     Foot fracture     bilateral    Hyperlipidemia     Hypertension     Lateral meniscal tear     Low vision, left eye, normal vision right eye     due to blood clot/ stroke behind Left eye    Seasonal allergies     Uses roller walker     UTI (urinary tract infection) 08/2017     Precautions: fall risk, WBAT RLE  Prior Therapy: yes  Medications: Honey Alejandre has a current medication list which includes the following prescription(s): aspirin, cephalexin, losartan, multivitamin-minerals-lutein, oxycodone-acetaminophen, pravastatin, and warfarin, and the following Facility-Administered Medications: sodium chloride 0.9%, celecoxib, diphenhydramine, fentanyl, gabapentin, gabapentin, hydrocodone-acetaminophen, ondansetron, ondansetron, oxycodone, oxycodone, promethazine (PHENERGAN) 6.25 mg in dextrose 5 % 50 mL IVPB, ropivacaine (NAROPIN) 0.2% ON-Q C-BLOC 400mL (med + pump) (SAF), sodium chloride 0.9% 3,000 mL with bacitracin 50,000 Units irrigation, and sodium chloride 0.9%.  Nutrition:  Normal  History of Present Illness: Chronic knee pain R knee, has failed conservative treatment. H/O L TKA, did well with this.   Prior Level of Function: Independent  Social History: lives with , steps to enter home. Will be staying with daughter in single story home during recovery. Home health will be done there, as well.   Place of Residence (Steps/Adaptations): see above  Functional Deficits Leading to Referral/Nature of Injury: difficulty with walking  Patient Therapy Goals: "I want to walk on the " "beach"    Subjective     Honey Alejandre states her R ankle hurts.    Pain:  Location: ankle, R  Description: Aching  Activities Which Increase Pain: being still  Activities Which Decrease Pain: motion  Pain Scale: 0/10 in R knee, "12"/10 in R ankle (laughing)    Objective     Range of Motion/Strength: R knee flexion 90* seated EOB, -10* ext (per visual estimation). Strength in WFL for transfers and ambulation.     Gait: With AD.  Device Used -  Rolling walker  Analysis: CGA - to SBA at end of ambulation trial, 20 feet in recovery room  Bed Mobility:Independent  Transfers: Assistance - verbal cuing to improve efficiency due to height of bed      Assessment       Initial Assessment (Pertinent finding, problem list and factors affecting outcome): Pt demonstrates safe use of RW, and requires SBA for ambulation and transfers. Pt is appropriate for DC to home with home health PT.  Rehab Potiential: excellent   History  Co-morbidities and personal factors that may impact the plan of care Examination  Body Structures and Functions, activity limitations and participation restrictions that may impact the plan of care    Clinical Presentation   Co-morbidities:   See PMH above        Personal Factors:   age Body Regions:   lower extremities    Body Systems:    ROM  strength  balance  gait  transfers  transitions  motor control            Participation Restrictions:   homebound     Activity limitations:   Learning and applying knowledge  no deficits    General Tasks and Commands  no deficits    Communication  no deficits    Mobility  lifting and carrying objects  walking  driving (bike, car, motorcycle)    Self care  no deficits    Domestic Life  shopping  cooking  doing house work (cleaning house, washing dishes, laundry)    Interactions/Relationships  no deficits    Life Areas  no deficits    Community and Social Life  community life  recreation and leisure         evolving clinical presentation with changing clinical " characteristics                      moderate   moderate  moderate Decision Making/ Complexity Score:  moderate           Plan     Discharge home with home health PT.      Therapist: Faith Kumar PT    I CERTIFY THE NEED FOR THESE SERVICES FURNISHED UNDER THIS PLAN OF TREATMENT AND WHILE UNDER MY CARE    Physician's comments: ________________________________________________________________________________________________________________________________________________      Physician's Name: ___________________________________

## 2018-05-15 NOTE — DISCHARGE INSTRUCTIONS
After surgery until first follow-up visit:    DOS:   Keep leg elevated (toes above your nose) for several hours per day while recovering from surgery.   Use walker for comfort. You may put weight on affected leg as tolerated after pain ball removed   Minimal activity and advance diet as tolerated   Home health nurse and PT to visit today or tomorrow depending on time of discharge.   Home health to perform dressing changes.   Keep dressing clean and dry.   Resume home medications     DONT:   Do not soak in tub.   DO NOT TAKE ADDITIONAL TYLENOL/ACETAMINOPHEN WHILE TAKING NARCOTIC PAIN MEDICATION THAT CONTAINS TYLENOL/ACETAMINOPHEN.      CALL PHYSICIAN FOR ANY QUESTIONS OR CONCERNS.      FOR EMERGENCIES:  Contact your doctor at [phone]        Discharge Instructions: After Your Surgery  Youve just had surgery. During surgery, you were given medicine called anesthesia to keep you relaxed and free of pain. After surgery, you may have some pain or nausea. This is common. Here are some tips for feeling better and getting well after surgery.     Stay on schedule with your medicine.   Going home  Your healthcare provider will show you how to take care of yourself when you go home. He or she will also answer your questions. Have an adult family member or friend drive you home. For the first 24 hours after your surgery:  · Do not drive or use heavy equipment.  · Do not make important decisions or sign legal papers.  · Do not drink alcohol.  · Have someone stay with you, if needed. He or she can watch for problems and help keep you safe.  Be sure to go to all follow-up visits with your healthcare provider. And rest after your surgery for as long as your healthcare provider tells you to.  Coping with pain  If you have pain after surgery, pain medicine will help you feel better. Take it as told, before pain becomes severe. Also, ask your healthcare provider or pharmacist about other ways to control pain. This  might be with heat, ice, or relaxation. And follow any other instructions your surgeon or nurse gives you.  Tips for taking pain medicine  To get the best relief possible, remember these points:  · Pain medicines can upset your stomach. Taking them with a little food may help.  · Most pain relievers taken by mouth need at least 20 to 30 minutes to start to work.  · Taking medicine on a schedule can help you remember to take it. Try to time your medicine so that you can take it before starting an activity. This might be before you get dressed, go for a walk, or sit down for dinner.  · Constipation is a common side effect of pain medicines. Call your healthcare provider before taking any medicines such as laxatives or stool softeners to help ease constipation. Also ask if you should skip any foods. Drinking lots of fluids and eating foods such as fruits and vegetables that are high in fiber can also help. Remember, do not take laxatives unless your surgeon has prescribed them.  · Drinking alcohol and taking pain medicine can cause dizziness and slow your breathing. It can even be deadly. Do not drink alcohol while taking pain medicine.  · Pain medicine can make you react more slowly to things. Do not drive or run machinery while taking pain medicine.  Your healthcare provider may tell you to take acetaminophen to help ease your pain. Ask him or her how much you are supposed to take each day. Acetaminophen or other pain relievers may interact with your prescription medicines or other over-the-counter (OTC) medicines. Some prescription medicines have acetaminophen and other ingredients. Using both prescription and OTC acetaminophen for pain can cause you to overdose. Read the labels on your OTC medicines with care. This will help you to clearly know the list of ingredients, how much to take, and any warnings. It may also help you not take too much acetaminophen. If you have questions or do not understand the  information, ask your pharmacist or healthcare provider to explain it to you before you take the OTC medicine.  Managing nausea  Some people have an upset stomach after surgery. This is often because of anesthesia, pain, or pain medicine, or the stress of surgery. These tips will help you handle nausea and eat healthy foods as you get better. If you were on a special food plan before surgery, ask your healthcare provider if you should follow it while you get better. These tips may help:  · Do not push yourself to eat. Your body will tell you when to eat and how much.  · Start off with clear liquids and soup. They are easier to digest.  · Next try semi-solid foods, such as mashed potatoes, applesauce, and gelatin, as you feel ready.  · Slowly move to solid foods. Dont eat fatty, rich, or spicy foods at first.  · Do not force yourself to have 3 large meals a day. Instead eat smaller amounts more often.  · Take pain medicines with a small amount of solid food, such as crackers or toast, to avoid nausea.     Call your surgeon if  · You still have pain an hour after taking medicine. The medicine may not be strong enough.  · You feel too sleepy, dizzy, or groggy. The medicine may be too strong.  · You have side effects like nausea, vomiting, or skin changes, such as rash, itching, or hives.       If you have obstructive sleep apnea  You were given anesthesia medicine during surgery to keep you comfortable and free of pain. After surgery, you may have more apnea spells because of this medicine and other medicines you were given. The spells may last longer than usual.   At home:  · Keep using the continuous positive airway pressure (CPAP) device when you sleep. Unless your healthcare provider tells you not to, use it when you sleep, day or night. CPAP is a common device used to treat obstructive sleep apnea.  · Talk with your provider before taking any pain medicine, muscle relaxants, or sedatives. Your provider will tell  you about the possible dangers of taking these medicines.  Date Last Reviewed: 12/1/2016  © 0980-0522 [employer]. [address] [place] [address], [place] [address]. All rights reserved. This information is not intended as a substitute for professional medical care. Always follow your healthcare professional's instructions.

## 2018-05-15 NOTE — OR NURSING
Patient,  and daughter notified that Dr Diamond still operating at [facility]. Estimated another 45 minutes before he will be here.

## 2018-05-16 ENCOUNTER — TELEPHONE (OUTPATIENT)
Dept: ORTHOPEDICS | Facility: CLINIC | Age: 73
End: 2018-05-16

## 2018-05-16 NOTE — ANESTHESIA POST-OP PAIN MANAGEMENT
Acute Pain Service Progress Note    Honey Alejandre is a 72 y.o., female, [id number]. She is staying with her daughter Esther [phone]    Surgery:  TKR    Post Op Day #: 1    Catheter type: perineural  saphenous     Infusion type: Ropivacaine 0.2%  8 basal    Problem List:    Active Hospital Problems    Diagnosis  POA    *Right knee DJD [M17.11]  Yes      Resolved Hospital Problems    Diagnosis Date Resolved POA   No resolved problems to display.       Subjective:     General appearance of alert, oriented, no complaints   Pain with rest: 4    Numbers   Pain with movement: 4    Numbers    Taking Tylenol only, no narcotics.     Assessment:     Pain control adequate    Plan:     Patient doing well, continue present treatment.    Evaluator Annamarie Maguire

## 2018-05-16 NOTE — TELEPHONE ENCOUNTER
----- Message from Batsheva Suggs sent at 5/16/2018  2:53 PM CDT -----  Contact: Daughter / /Esther Tinoco // [phone]  Patient's daughter left a voice mail message today at 11:30 needing to speak with the nurse regarding some questions about her Mom's recent surgery.  Please call.

## 2018-05-16 NOTE — TELEPHONE ENCOUNTER
----- Message from Izabella Hurley sent at 5/16/2018  9:25 AM CDT -----  Home Health states that she is in the home.  Please call [facility] in regards to checking patients PTINR.   Please call Marissa at [phone]

## 2018-05-16 NOTE — TELEPHONE ENCOUNTER
Talked with Marissa from [facility] and notified her that PT/INR are to be drawn for 6 days and then weekly.

## 2018-05-17 ENCOUNTER — ANTI-COAG VISIT (OUTPATIENT)
Dept: CARDIOLOGY | Facility: CLINIC | Age: 73
End: 2018-05-17

## 2018-05-17 VITALS
HEIGHT: 61 IN | BODY MASS INDEX: 22.28 KG/M2 | WEIGHT: 118 LBS | RESPIRATION RATE: 16 BRPM | DIASTOLIC BLOOD PRESSURE: 62 MMHG | HEART RATE: 69 BPM | OXYGEN SATURATION: 97 % | TEMPERATURE: 98 F | SYSTOLIC BLOOD PRESSURE: 131 MMHG

## 2018-05-17 LAB — INR PPP: 1.5 (ref 2–3)

## 2018-05-17 NOTE — ADDENDUM NOTE
Addendum  created 05/17/18 1313 by Mainor Resendiz MD    Anesthesia Event edited, Sign clinical note

## 2018-05-18 ENCOUNTER — ANTI-COAG VISIT (OUTPATIENT)
Dept: CARDIOLOGY | Facility: CLINIC | Age: 73
End: 2018-05-18

## 2018-05-18 LAB — INR PPP: 1.6 (ref 2–3)

## 2018-05-18 NOTE — PROGRESS NOTES
@5902  [facility] for Esther daughter [phone] with dosing instructions and recheck date. Patient is currently staying at daughter's house. Routed to .

## 2018-05-18 NOTE — PROGRESS NOTES
5/18/18 @1000  New admit to cc s/p right TKA on 5/15/18. Patient is currently staying with Esther, daughter [phone] . All medical information obtained is from daughter. 5/14/18 started warfarin 5 mg qd. [facility]  with home visit this am. Patient has eaten excessive vit K vegetables this week. Cc education reviewed with daughter, including vitamin K diet consistency and portions. Cc education mailed to home. Continue 6 mg qd and recheck Monday. Daughter verbalizes understanding. Routed to hh.

## 2018-05-21 ENCOUNTER — ANTI-COAG VISIT (OUTPATIENT)
Dept: CARDIOLOGY | Facility: CLINIC | Age: 73
End: 2018-05-21

## 2018-05-21 DIAGNOSIS — Z79.01 PROPHYLACTIC USE OF WARFARIN FOR VENOUS THROMBOEMBOLISM: ICD-10-CM

## 2018-05-21 LAB — INR PPP: 2.5 (ref 2–3)

## 2018-05-24 NOTE — PROGRESS NOTES
5/24/18 @1605   Patient called back and rescheduled cc appt tomorrow, as patient is discharged from .

## 2018-05-24 NOTE — PROGRESS NOTES
Spoke c pt daughter she says pt is at physcial theraphy & will call cc once she gets home.   Also lvm for pt to call to schedule appt.

## 2018-05-28 ENCOUNTER — OFFICE VISIT (OUTPATIENT)
Dept: ORTHOPEDICS | Facility: CLINIC | Age: 73
End: 2018-05-28
Payer: MEDICARE

## 2018-05-28 VITALS — BODY MASS INDEX: 22.28 KG/M2 | HEIGHT: 61 IN | WEIGHT: 118 LBS

## 2018-05-28 DIAGNOSIS — G89.29 CHRONIC PAIN OF RIGHT KNEE: ICD-10-CM

## 2018-05-28 DIAGNOSIS — M79.89 RIGHT LEG SWELLING: ICD-10-CM

## 2018-05-28 DIAGNOSIS — Z96.651 S/P TOTAL KNEE ARTHROPLASTY, RIGHT: Primary | ICD-10-CM

## 2018-05-28 DIAGNOSIS — M25.561 CHRONIC PAIN OF RIGHT KNEE: ICD-10-CM

## 2018-05-28 PROCEDURE — 99024 POSTOP FOLLOW-UP VISIT: CPT | Mod: S$GLB,,, | Performed by: ORTHOPAEDIC SURGERY

## 2018-05-28 PROCEDURE — 99999 PR PBB SHADOW E&M-EST. PATIENT-LVL II: CPT | Mod: PBBFAC,,, | Performed by: ORTHOPAEDIC SURGERY

## 2018-06-19 DIAGNOSIS — M25.561 CHRONIC PAIN OF RIGHT KNEE: Primary | ICD-10-CM

## 2018-06-19 DIAGNOSIS — G89.29 CHRONIC PAIN OF RIGHT KNEE: Primary | ICD-10-CM

## 2018-06-20 ENCOUNTER — PES CALL (OUTPATIENT)
Dept: ADMINISTRATIVE | Facility: CLINIC | Age: 73
End: 2018-06-20

## 2018-06-20 RX ORDER — LOSARTAN POTASSIUM 50 MG/1
50 TABLET ORAL DAILY
Qty: 90 TABLET | Refills: 0 | Status: SHIPPED | OUTPATIENT
Start: 2018-06-20 | End: 2018-08-22 | Stop reason: SDUPTHER

## 2018-06-20 NOTE — TELEPHONE ENCOUNTER
LOV 4-24-18 with Dr. Lozano  Labs are up to date      Please advise refill request on behalf of Dr. Lozano

## 2018-06-25 ENCOUNTER — HOSPITAL ENCOUNTER (OUTPATIENT)
Dept: RADIOLOGY | Facility: HOSPITAL | Age: 73
Discharge: HOME | End: 2018-06-25
Attending: ORTHOPAEDIC SURGERY
Payer: MEDICARE

## 2018-06-25 ENCOUNTER — OFFICE VISIT (OUTPATIENT)
Dept: ORTHOPEDICS | Facility: CLINIC | Age: 73
End: 2018-06-25
Payer: MEDICARE

## 2018-06-25 VITALS — HEIGHT: 61 IN | WEIGHT: 118 LBS | BODY MASS INDEX: 22.28 KG/M2

## 2018-06-25 DIAGNOSIS — M17.11 PRIMARY OSTEOARTHRITIS OF RIGHT KNEE: ICD-10-CM

## 2018-06-25 DIAGNOSIS — M25.561 CHRONIC PAIN OF RIGHT KNEE: ICD-10-CM

## 2018-06-25 DIAGNOSIS — Z96.651 S/P TOTAL KNEE ARTHROPLASTY, RIGHT: Primary | ICD-10-CM

## 2018-06-25 DIAGNOSIS — G89.29 CHRONIC PAIN OF RIGHT KNEE: ICD-10-CM

## 2018-06-25 PROCEDURE — 73560 X-RAY EXAM OF KNEE 1 OR 2: CPT | Mod: TC,PO,LT

## 2018-06-25 PROCEDURE — 99999 PR PBB SHADOW E&M-EST. PATIENT-LVL III: CPT | Mod: PBBFAC,,, | Performed by: ORTHOPAEDIC SURGERY

## 2018-06-25 PROCEDURE — 73562 X-RAY EXAM OF KNEE 3: CPT | Mod: 26,RT,, | Performed by: RADIOLOGY

## 2018-06-25 PROCEDURE — 99024 POSTOP FOLLOW-UP VISIT: CPT | Mod: S$GLB,,, | Performed by: ORTHOPAEDIC SURGERY

## 2018-06-25 PROCEDURE — 73560 X-RAY EXAM OF KNEE 1 OR 2: CPT | Mod: 26,XS,LT, | Performed by: RADIOLOGY

## 2018-07-23 ENCOUNTER — TELEPHONE (OUTPATIENT)
Dept: FAMILY MEDICINE | Facility: CLINIC | Age: 73
End: 2018-07-23

## 2018-07-23 NOTE — TELEPHONE ENCOUNTER
----- Message from Leobardo Keita sent at 7/23/2018  2:26 PM CDT -----  Contact: Zahra from [employer]  Name of Who is Calling: Zahra      What is the request in detail: Zahra is calling wanting to know if fax was received last week. Zahra is going to send over another fax today.      Can the clinic reply by [facility]: no       [facility]: Zahra [phone]

## 2018-07-23 NOTE — TELEPHONE ENCOUNTER
Received fax, paperwork placed in Dr. [facility]. Attempted to notify patricia Sweeney signal

## 2018-07-27 ENCOUNTER — TELEPHONE (OUTPATIENT)
Dept: ADMINISTRATIVE | Facility: CLINIC | Age: 73
End: 2018-07-27

## 2018-07-27 ENCOUNTER — TELEPHONE (OUTPATIENT)
Dept: CARDIOLOGY | Facility: CLINIC | Age: 73
End: 2018-07-27

## 2018-07-27 NOTE — TELEPHONE ENCOUNTER
Contacted patient to reschedule his appointment from 8/13/2018 to 10/9/2018 due to the fact Dr. Delong will not be in the hospital on that day.  Patient agrees and understands.

## 2018-07-30 ENCOUNTER — PES CALL (OUTPATIENT)
Dept: ADMINISTRATIVE | Facility: CLINIC | Age: 73
End: 2018-07-30

## 2018-07-31 DIAGNOSIS — G89.29 CHRONIC PAIN OF RIGHT KNEE: Primary | ICD-10-CM

## 2018-07-31 DIAGNOSIS — M25.561 CHRONIC PAIN OF RIGHT KNEE: Primary | ICD-10-CM

## 2018-08-06 ENCOUNTER — HOSPITAL ENCOUNTER (OUTPATIENT)
Dept: RADIOLOGY | Facility: HOSPITAL | Age: 73
Discharge: HOME | End: 2018-08-06
Attending: ORTHOPAEDIC SURGERY
Payer: MEDICARE

## 2018-08-06 ENCOUNTER — OFFICE VISIT (OUTPATIENT)
Dept: ORTHOPEDICS | Facility: CLINIC | Age: 73
End: 2018-08-06
Payer: MEDICARE

## 2018-08-06 VITALS — BODY MASS INDEX: 22.28 KG/M2 | HEIGHT: 61 IN | WEIGHT: 118 LBS

## 2018-08-06 DIAGNOSIS — Z96.651 S/P TOTAL KNEE ARTHROPLASTY, RIGHT: Primary | ICD-10-CM

## 2018-08-06 DIAGNOSIS — G89.29 CHRONIC PAIN OF RIGHT KNEE: ICD-10-CM

## 2018-08-06 DIAGNOSIS — M25.561 CHRONIC PAIN OF RIGHT KNEE: ICD-10-CM

## 2018-08-06 PROCEDURE — 99999 PR PBB SHADOW E&M-EST. PATIENT-LVL II: CPT | Mod: PBBFAC,,, | Performed by: ORTHOPAEDIC SURGERY

## 2018-08-06 PROCEDURE — 99024 POSTOP FOLLOW-UP VISIT: CPT | Mod: S$GLB,,, | Performed by: ORTHOPAEDIC SURGERY

## 2018-08-06 PROCEDURE — 73562 X-RAY EXAM OF KNEE 3: CPT | Mod: 26,RT,, | Performed by: RADIOLOGY

## 2018-08-06 PROCEDURE — 73560 X-RAY EXAM OF KNEE 1 OR 2: CPT | Mod: TC,PO,LT

## 2018-08-06 PROCEDURE — 73560 X-RAY EXAM OF KNEE 1 OR 2: CPT | Mod: 26,XS,LT, | Performed by: RADIOLOGY

## 2018-08-06 PROCEDURE — 73562 X-RAY EXAM OF KNEE 3: CPT | Mod: TC,PO,RT

## 2018-08-22 RX ORDER — LOSARTAN POTASSIUM 50 MG/1
50 TABLET ORAL DAILY
Qty: 90 TABLET | Refills: 0 | Status: SHIPPED | OUTPATIENT
Start: 2018-08-22 | End: 2018-10-25 | Stop reason: SDUPTHER

## 2018-08-27 ENCOUNTER — TELEPHONE (OUTPATIENT)
Dept: INTERNAL MEDICINE | Facility: CLINIC | Age: 73
End: 2018-08-27

## 2018-08-27 NOTE — TELEPHONE ENCOUNTER
----- Message from Nlelie Agustin sent at 8/27/2018  2:38 PM CDT -----  Contact: self  Patient need to speak to nurse regarding renewing handicap tag  Patient states her current handicap tag is about to go out     Please call to advice [phone] (home) leave message if no answer per patient

## 2018-10-09 ENCOUNTER — OFFICE VISIT (OUTPATIENT)
Dept: CARDIOLOGY | Facility: CLINIC | Age: 73
End: 2018-10-09
Payer: MEDICARE

## 2018-10-09 VITALS
WEIGHT: 121.13 LBS | HEIGHT: 62 IN | SYSTOLIC BLOOD PRESSURE: 112 MMHG | DIASTOLIC BLOOD PRESSURE: 70 MMHG | BODY MASS INDEX: 22.29 KG/M2 | HEART RATE: 76 BPM

## 2018-10-09 DIAGNOSIS — I10 ESSENTIAL HYPERTENSION: ICD-10-CM

## 2018-10-09 DIAGNOSIS — E78.5 HYPERLIPIDEMIA, UNSPECIFIED HYPERLIPIDEMIA TYPE: ICD-10-CM

## 2018-10-09 DIAGNOSIS — I25.83 CORONARY ARTERY DISEASE DUE TO LIPID RICH PLAQUE: Primary | ICD-10-CM

## 2018-10-09 DIAGNOSIS — I25.10 CORONARY ARTERY DISEASE DUE TO LIPID RICH PLAQUE: Primary | ICD-10-CM

## 2018-10-09 PROCEDURE — 99999 PR PBB SHADOW E&M-EST. PATIENT-LVL III: CPT | Mod: PBBFAC,,, | Performed by: INTERNAL MEDICINE

## 2018-10-09 PROCEDURE — 1101F PT FALLS ASSESS-DOCD LE1/YR: CPT | Mod: CPTII,,, | Performed by: INTERNAL MEDICINE

## 2018-10-09 PROCEDURE — 99214 OFFICE O/P EST MOD 30 MIN: CPT | Mod: S$PBB,,, | Performed by: INTERNAL MEDICINE

## 2018-10-09 PROCEDURE — 3078F DIAST BP <80 MM HG: CPT | Mod: CPTII,,, | Performed by: INTERNAL MEDICINE

## 2018-10-09 PROCEDURE — 99213 OFFICE O/P EST LOW 20 MIN: CPT | Mod: PBBFAC,PO | Performed by: INTERNAL MEDICINE

## 2018-10-09 PROCEDURE — 3074F SYST BP LT 130 MM HG: CPT | Mod: CPTII,,, | Performed by: INTERNAL MEDICINE

## 2018-10-09 NOTE — PROGRESS NOTES
Subjective:    Patient ID:  Honey Alejandre is a 72 y.o. female who presents for follow-up of cad    HPI  She comes with no complaints, no chest pain, no shortness of breath  FC II    Review of Systems   Constitution: Negative for decreased appetite, weakness, malaise/fatigue, weight gain and weight loss.   Cardiovascular: Negative for chest pain, dyspnea on exertion, leg swelling, palpitations and syncope.   Respiratory: Negative for cough and shortness of breath.    Gastrointestinal: Negative.    All other systems reviewed and are negative.       Objective:      Physical Exam   Constitutional: She is oriented to person, place, and time. She appears well-developed and well-nourished.   HENT:   Head: Normocephalic.   Eyes: Pupils are equal, round, and reactive to light.   Neck: Normal range of motion. Neck supple. No JVD present. Carotid bruit is not present. No thyromegaly present.   Cardiovascular: Normal rate, regular rhythm, normal heart sounds, intact distal pulses and normal pulses. PMI is not displaced. Exam reveals no gallop.   No murmur heard.  Pulmonary/Chest: Effort normal and breath sounds normal.   Abdominal: Soft. Normal appearance. She exhibits no mass. There is no hepatosplenomegaly. There is no tenderness.   Musculoskeletal: Normal range of motion. She exhibits no edema.   Neurological: She is alert and oriented to person, place, and time. She has normal strength and normal reflexes. No sensory deficit.   Skin: Skin is warm and intact.   Psychiatric: She has a normal mood and affect.   Nursing note and vitals reviewed.        Assessment:       1. Coronary artery disease due to lipid rich plaque    2. Essential hypertension    3. Hyperlipidemia, unspecified hyperlipidemia type         Plan:     Continue all cardiac medications  Regular exercise program  1 yr f/u with stress echo

## 2018-10-09 NOTE — Clinical Note
October 9, 2018      Mickie Lozano MD           [place] [facility]  [address] [facility] [address] [place] [address]  Phone: [phone]          Patient: Honey Alejandre   MR Number: [id number]   YOB: 1945   Date of Visit: 10/9/2018       Dear Dr. Mickie Lozano:    Thank you for referring Honey Alejandre to me for evaluation. Attached you will find relevant portions of my assessment and plan of care.    If you have questions, please do not hesitate to call me. I look forward to following Honey Alejandre along with you.    Sincerely,    Chandler Flores MD    Enclosure  CC:  No Recipients    If you would like to receive this communication electronically, please contact [email or web address] or [phone] to request more information on [employer] access.    For providers and/or their staff who would like to refer a patient to [facility], please contact us through our one-stop-shop provider referral line, [facility] , at [phone].    If you feel you have received this communication in error or would no longer like to receive these types of communications, please e-mail [email or web address]

## 2018-10-25 ENCOUNTER — TELEPHONE (OUTPATIENT)
Dept: FAMILY MEDICINE | Facility: CLINIC | Age: 73
End: 2018-10-25

## 2018-10-25 RX ORDER — LOSARTAN POTASSIUM 50 MG/1
TABLET ORAL
Qty: 90 TABLET | Refills: 0 | Status: SHIPPED | OUTPATIENT
Start: 2018-10-25 | End: 2018-12-30 | Stop reason: SDUPTHER

## 2018-10-25 NOTE — TELEPHONE ENCOUNTER
Left message asking pt to contact the office at .    Reason: schedule annual [employer] wellness visit.

## 2018-12-31 RX ORDER — LOSARTAN POTASSIUM 50 MG/1
TABLET ORAL
Qty: 90 TABLET | Refills: 0 | Status: SHIPPED | OUTPATIENT
Start: 2018-12-31 | End: 2019-03-05 | Stop reason: SDUPTHER

## 2019-03-06 RX ORDER — LOSARTAN POTASSIUM 50 MG/1
TABLET ORAL
Qty: 90 TABLET | Refills: 0 | Status: SHIPPED | OUTPATIENT
Start: 2019-03-06

## 2019-05-08 RX ORDER — LOSARTAN POTASSIUM 50 MG/1
TABLET ORAL
Qty: 90 TABLET | Refills: 0 | OUTPATIENT
Start: 2019-05-08

## 2019-08-29 ENCOUNTER — IMMUNIZATION (OUTPATIENT)
Dept: PHARMACY | Facility: CLINIC | Age: 74
End: 2019-08-29
Payer: MEDICARE

## 2019-10-08 ENCOUNTER — TELEPHONE (OUTPATIENT)
Dept: CARDIOLOGY | Facility: CLINIC | Age: 74
End: 2019-10-08

## 2019-10-08 DIAGNOSIS — I25.83 CORONARY ARTERY DISEASE DUE TO LIPID RICH PLAQUE: Primary | ICD-10-CM

## 2019-10-08 DIAGNOSIS — I70.0 AORTIC ATHEROSCLEROSIS: ICD-10-CM

## 2019-10-08 DIAGNOSIS — I25.10 CORONARY ARTERY DISEASE DUE TO LIPID RICH PLAQUE: Primary | ICD-10-CM

## 2019-10-08 DIAGNOSIS — E78.5 HYPERLIPIDEMIA, UNSPECIFIED HYPERLIPIDEMIA TYPE: ICD-10-CM

## 2019-10-08 DIAGNOSIS — I10 ESSENTIAL HYPERTENSION: ICD-10-CM

## 2019-10-08 NOTE — TELEPHONE ENCOUNTER
----- Message from Yue Gonzales sent at 10/8/2019  3:44 PM CDT -----  Contact: self  Type: Needs Medical Advice    Who Called:  self  Symptoms (please be specific):    How long has patient had these symptoms:    Pharmacy name and phone #:    Best Call Back Number: [phone]  Additional Information: Patient is calling regarding a call her daughter received about a test ordered for her annual appointment on 10/09/19. Please call patient not daughter at number above. Thanks!

## 2019-10-09 ENCOUNTER — OFFICE VISIT (OUTPATIENT)
Dept: CARDIOLOGY | Facility: CLINIC | Age: 74
End: 2019-10-09
Payer: MEDICARE

## 2019-10-09 VITALS
DIASTOLIC BLOOD PRESSURE: 70 MMHG | SYSTOLIC BLOOD PRESSURE: 158 MMHG | HEART RATE: 64 BPM | WEIGHT: 116.38 LBS | HEIGHT: 62 IN | BODY MASS INDEX: 21.42 KG/M2

## 2019-10-09 DIAGNOSIS — I10 ESSENTIAL HYPERTENSION: ICD-10-CM

## 2019-10-09 DIAGNOSIS — E78.5 HYPERLIPIDEMIA, UNSPECIFIED HYPERLIPIDEMIA TYPE: ICD-10-CM

## 2019-10-09 DIAGNOSIS — I25.83 CORONARY ARTERY DISEASE DUE TO LIPID RICH PLAQUE: Primary | ICD-10-CM

## 2019-10-09 DIAGNOSIS — I25.10 CORONARY ARTERY DISEASE DUE TO LIPID RICH PLAQUE: Primary | ICD-10-CM

## 2019-10-09 PROCEDURE — 99214 PR OFFICE/OUTPT VISIT, EST, LEVL IV, 30-39 MIN: ICD-10-PCS | Mod: S$GLB,,, | Performed by: INTERNAL MEDICINE

## 2019-10-09 PROCEDURE — 3078F DIAST BP <80 MM HG: CPT | Mod: CPTII,S$GLB,, | Performed by: INTERNAL MEDICINE

## 2019-10-09 PROCEDURE — 3078F PR MOST RECENT DIASTOLIC BLOOD PRESSURE < 80 MM HG: ICD-10-PCS | Mod: CPTII,S$GLB,, | Performed by: INTERNAL MEDICINE

## 2019-10-09 PROCEDURE — 99999 PR PBB SHADOW E&M-EST. PATIENT-LVL III: CPT | Mod: PBBFAC,,, | Performed by: INTERNAL MEDICINE

## 2019-10-09 PROCEDURE — 1101F PR PT FALLS ASSESS DOC 0-1 FALLS W/OUT INJ PAST YR: ICD-10-PCS | Mod: CPTII,S$GLB,, | Performed by: INTERNAL MEDICINE

## 2019-10-09 PROCEDURE — 3077F PR MOST RECENT SYSTOLIC BLOOD PRESSURE >= 140 MM HG: ICD-10-PCS | Mod: CPTII,S$GLB,, | Performed by: INTERNAL MEDICINE

## 2019-10-09 PROCEDURE — 3077F SYST BP >= 140 MM HG: CPT | Mod: CPTII,S$GLB,, | Performed by: INTERNAL MEDICINE

## 2019-10-09 PROCEDURE — 99214 OFFICE O/P EST MOD 30 MIN: CPT | Mod: S$GLB,,, | Performed by: INTERNAL MEDICINE

## 2019-10-09 PROCEDURE — 1101F PT FALLS ASSESS-DOCD LE1/YR: CPT | Mod: CPTII,S$GLB,, | Performed by: INTERNAL MEDICINE

## 2019-10-09 PROCEDURE — 99999 PR PBB SHADOW E&M-EST. PATIENT-LVL III: ICD-10-PCS | Mod: PBBFAC,,, | Performed by: INTERNAL MEDICINE

## 2019-10-09 NOTE — PROGRESS NOTES
Subjective:    Patient ID:  Honey Alejandre is a 73 y.o. female who presents for follow-up of Coronary Artery Disease (follow up); Hyperlipidemia; and Hypertension      HPI  She comes with no complaints, no chest pain, no shortness of breath  FC II    Review of Systems   Constitution: Negative for decreased appetite, malaise/fatigue, weight gain and weight loss.   Cardiovascular: Negative for chest pain, dyspnea on exertion, leg swelling, palpitations and syncope.   Respiratory: Negative for cough and shortness of breath.    Gastrointestinal: Negative.    Neurological: Negative for weakness.   All other systems reviewed and are negative.       Objective:      Physical Exam   Constitutional: She is oriented to person, place, and time. She appears well-developed and well-nourished.   HENT:   Head: Normocephalic.   Eyes: Pupils are equal, round, and reactive to light.   Neck: Normal range of motion. Neck supple. No JVD present. Carotid bruit is not present. No thyromegaly present.   Cardiovascular: Normal rate, regular rhythm, normal heart sounds, intact distal pulses and normal pulses. PMI is not displaced. Exam reveals no gallop.   No murmur heard.  Pulmonary/Chest: Effort normal and breath sounds normal.   Abdominal: Soft. Normal appearance. She exhibits no mass. There is no hepatosplenomegaly. There is no tenderness.   Musculoskeletal: Normal range of motion. She exhibits no edema.   Neurological: She is alert and oriented to person, place, and time. She has normal strength and normal reflexes. No sensory deficit.   Skin: Skin is warm and intact.   Psychiatric: She has a normal mood and affect.   Nursing note and vitals reviewed.        Assessment:       1. Coronary artery disease due to lipid rich plaque    2. Essential hypertension    3. Hyperlipidemia, unspecified hyperlipidemia type         Plan:     Continue all cardiac medications  Regular exercise program  1 yr f/u

## 2020-03-05 NOTE — ANESTHESIA PROCEDURE NOTES
Medication (s) requested: hydrocodone/apap  Last office visit: 10/17/19, scheduled today 3/5/20  Last refill: 7/22/19   Is the patient due for refill of this medication (s): Yes  PDMP review: Criteria met. Forwarded to Physician/MARCOS for signature.  Medication Contract in Place: No  Date of medication Contract signed: n/a  Date of last urine drug screen: none on file  Results of last urine drug screen: n/a    RN CRITERIA:  Review PDMP for the past 6 months against criteria to process the order:  • Pt does not have any history of controlled substance use listed in the PDMP  OR:  • Patient has information in PDMP and it is limited to:   o One Practitioner  o One medication listed  o The same dose  o No change in prescriptions or patterns since the last refill  o No PDMP alerts  OR:  • Multiple practitioners have prescribed controlled substances  • Multiple medications listed  • No change in prescriptions or patterns since the last refill  • No PDMP alerts         Spinal    Diagnosis: right knee DJD  Patient location during procedure: OR  Start time: 5/15/2018 10:23 AM  Timeout: 5/15/2018 10:22 AM  End time: 5/15/2018 10:28 AM  Staffing  Anesthesiologist: STEPHY THOMAS  Performed: anesthesiologist   Preanesthetic Checklist  Completed: patient identified, site marked, surgical consent, pre-op evaluation, timeout performed, IV checked, risks and benefits discussed and monitors and equipment checked  Spinal Block  Patient position: sitting  Prep: ChloraPrep  Patient monitoring: heart rate, cardiac monitor and continuous pulse ox  Approach: midline  Location: L3-4  Injection technique: single shot  CSF Fluid: clear free-flowing CSF  Needle  Needle type: pencil-tip   Needle gauge: 25 G  Needle length: 3.5 in  Additional Documentation: incremental injection and no paresthesia on injection  Needle localization: anatomical landmarks  Assessment  Ease of block: easy  Patient's tolerance of the procedure: comfortable throughout block and no complaints  Medications:  Bolus administered: 2.4 mL of 2.0 mepivacaine

## 2020-09-09 ENCOUNTER — CLINICAL SUPPORT (OUTPATIENT)
Dept: CARDIOLOGY | Facility: CLINIC | Age: 75
End: 2020-09-09
Attending: INTERNAL MEDICINE
Payer: MEDICARE

## 2020-09-09 ENCOUNTER — IMMUNIZATION (OUTPATIENT)
Dept: PHARMACY | Facility: CLINIC | Age: 75
End: 2020-09-09
Payer: MEDICARE

## 2020-09-09 VITALS — HEIGHT: 62 IN | BODY MASS INDEX: 21.35 KG/M2 | WEIGHT: 116 LBS

## 2020-09-09 DIAGNOSIS — I25.83 CORONARY ARTERY DISEASE DUE TO LIPID RICH PLAQUE: ICD-10-CM

## 2020-09-09 DIAGNOSIS — I70.0 AORTIC ATHEROSCLEROSIS: ICD-10-CM

## 2020-09-09 DIAGNOSIS — I10 ESSENTIAL HYPERTENSION: ICD-10-CM

## 2020-09-09 DIAGNOSIS — E78.5 HYPERLIPIDEMIA, UNSPECIFIED HYPERLIPIDEMIA TYPE: ICD-10-CM

## 2020-09-09 DIAGNOSIS — I25.10 CORONARY ARTERY DISEASE DUE TO LIPID RICH PLAQUE: ICD-10-CM

## 2020-09-09 LAB
ASCENDING AORTA: 2.49 CM
BSA FOR ECHO PROCEDURE: 1.52 M2
CV ECHO LV RWT: 0.48 CM
CV STRESS BASE HR: 81 BPM
DIASTOLIC BLOOD PRESSURE: 52 MMHG
DOP CALC LVOT AREA: 3.3 CM2
DOP CALC LVOT DIAMETER: 2.05 CM
DOP CALC LVOT PEAK VEL: 0.99 M/S
DOP CALC LVOT STROKE VOLUME: 74.52 CM3
DOP CALCLVOT PEAK VEL VTI: 22.59 CM
E WAVE DECELERATION TIME: 314.01 MSEC
E/A RATIO: 0.7
E/E' RATIO: 14.2 M/S
ECHO LV POSTERIOR WALL: 1 CM (ref 0.6–1.1)
FRACTIONAL SHORTENING: 36 % (ref 28–44)
INTERVENTRICULAR SEPTUM: 0.98 CM (ref 0.6–1.1)
IVRT: 114.18 MSEC
LA MAJOR: 3.66 CM
LA MINOR: 4.65 CM
LA WIDTH: 2.3 CM
LEFT ATRIUM SIZE: 3.15 CM
LEFT ATRIUM VOLUME INDEX: 16.6 ML/M2
LEFT ATRIUM VOLUME: 25.22 CM3
LEFT INTERNAL DIMENSION IN SYSTOLE: 2.68 CM (ref 2.1–4)
LEFT VENTRICLE DIASTOLIC VOLUME INDEX: 50.93 ML/M2
LEFT VENTRICLE DIASTOLIC VOLUME: 77.24 ML
LEFT VENTRICLE MASS INDEX: 88 G/M2
LEFT VENTRICLE SYSTOLIC VOLUME INDEX: 17.5 ML/M2
LEFT VENTRICLE SYSTOLIC VOLUME: 26.51 ML
LEFT VENTRICULAR INTERNAL DIMENSION IN DIASTOLE: 4.17 CM (ref 3.5–6)
LEFT VENTRICULAR MASS: 133.8 G
LV LATERAL E/E' RATIO: 14.2 M/S
LV SEPTAL E/E' RATIO: 14.2 M/S
MV PEAK A VEL: 1.01 M/S
MV PEAK E VEL: 0.71 M/S
OHS CV CPX 1 MINUTE RECOVERY HEART RATE: 75 BPM
OHS CV CPX 85 PERCENT MAX PREDICTED HEART RATE MALE: 118
OHS CV CPX ESTIMATED METS: 7
OHS CV CPX MAX PREDICTED HEART RATE: 139
OHS CV CPX PATIENT IS FEMALE: 1
OHS CV CPX PATIENT IS MALE: 0
OHS CV CPX PEAK DIASTOLIC BLOOD PRESSURE: 52 MMHG
OHS CV CPX PEAK HEAR RATE: 100 BPM
OHS CV CPX PEAK RATE PRESSURE PRODUCT: NORMAL
OHS CV CPX PEAK SYSTOLIC BLOOD PRESSURE: 152 MMHG
OHS CV CPX PERCENT MAX PREDICTED HEART RATE ACHIEVED: 72
OHS CV CPX RATE PRESSURE PRODUCT PRESENTING: 8991
PISA TR MAX VEL: 1.63 M/S
PULM VEIN S/D RATIO: 1.8
PV PEAK D VEL: 0.4 M/S
PV PEAK S VEL: 0.72 M/S
RA MAJOR: 3.74 CM
RA PRESSURE: 3 MMHG
RA WIDTH: 2.91 CM
SINUS: 2.35 CM
STJ: 2.23 CM
STRESS ECHO POST EXERCISE DUR MIN: 4 MINUTES
STRESS ECHO POST EXERCISE DUR SEC: 6 SECONDS
SYSTOLIC BLOOD PRESSURE: 111 MMHG
TDI LATERAL: 0.05 M/S
TDI SEPTAL: 0.05 M/S
TDI: 0.05 M/S
TR MAX PG: 11 MMHG
TV REST PULMONARY ARTERY PRESSURE: 14 MMHG

## 2020-09-09 PROCEDURE — 93351 STRESS ECHO (CUPID ONLY): ICD-10-PCS | Mod: S$GLB,,, | Performed by: INTERNAL MEDICINE

## 2020-09-09 PROCEDURE — 99999 PR PBB SHADOW E&M-EST. PATIENT-LVL I: CPT | Mod: PBBFAC,,,

## 2020-09-09 PROCEDURE — 99999 PR PBB SHADOW E&M-EST. PATIENT-LVL I: ICD-10-PCS | Mod: PBBFAC,,,

## 2020-09-09 PROCEDURE — 93351 STRESS TTE COMPLETE: CPT | Mod: S$GLB,,, | Performed by: INTERNAL MEDICINE

## 2021-01-09 ENCOUNTER — IMMUNIZATION (OUTPATIENT)
Dept: FAMILY MEDICINE | Facility: CLINIC | Age: 76
End: 2021-01-09
Payer: MEDICARE

## 2021-01-09 DIAGNOSIS — Z23 NEED FOR VACCINATION: ICD-10-CM

## 2021-01-09 PROCEDURE — 91300 COVID-19, MRNA, LNP-S, PF, 30 MCG/0.3 ML DOSE VACCINE: CPT | Mod: PBBFAC | Performed by: INTERNAL MEDICINE

## 2021-01-30 ENCOUNTER — IMMUNIZATION (OUTPATIENT)
Dept: FAMILY MEDICINE | Facility: CLINIC | Age: 76
End: 2021-01-30
Payer: MEDICARE

## 2021-01-30 DIAGNOSIS — Z23 NEED FOR VACCINATION: Primary | ICD-10-CM

## 2021-01-30 PROCEDURE — 0002A COVID-19, MRNA, LNP-S, PF, 30 MCG/0.3 ML DOSE VACCINE: CPT | Mod: PBBFAC | Performed by: INTERNAL MEDICINE

## 2021-01-30 PROCEDURE — 91300 COVID-19, MRNA, LNP-S, PF, 30 MCG/0.3 ML DOSE VACCINE: CPT | Mod: PBBFAC | Performed by: INTERNAL MEDICINE

## 2021-04-13 ENCOUNTER — LAB VISIT (OUTPATIENT)
Dept: LAB | Facility: HOSPITAL | Age: 76
End: 2021-04-13
Attending: FAMILY MEDICINE
Payer: MEDICARE

## 2021-04-13 DIAGNOSIS — E78.5 HYPERLIPIDEMIA: ICD-10-CM

## 2021-04-13 DIAGNOSIS — I10 HYPERTENSION, ESSENTIAL: Primary | ICD-10-CM

## 2021-04-13 LAB
ALBUMIN SERPL BCP-MCNC: 3.3 G/DL (ref 3.5–5.2)
ALP SERPL-CCNC: 55 U/L (ref 55–135)
ALT SERPL W/O P-5'-P-CCNC: 39 U/L (ref 10–44)
ANION GAP SERPL CALC-SCNC: 7 MMOL/L (ref 8–16)
AST SERPL-CCNC: 32 U/L (ref 10–40)
BILIRUB SERPL-MCNC: 0.8 MG/DL (ref 0.1–1)
BUN SERPL-MCNC: 14 MG/DL (ref 8–23)
CALCIUM SERPL-MCNC: 8.9 MG/DL (ref 8.7–10.5)
CHLORIDE SERPL-SCNC: 107 MMOL/L (ref 95–110)
CHOLEST SERPL-MCNC: 158 MG/DL (ref 120–199)
CHOLEST/HDLC SERPL: 4.5 {RATIO} (ref 2–5)
CO2 SERPL-SCNC: 28 MMOL/L (ref 23–29)
CREAT SERPL-MCNC: 0.9 MG/DL (ref 0.5–1.4)
ERYTHROCYTE [DISTWIDTH] IN BLOOD BY AUTOMATED COUNT: 14.4 % (ref 11.5–14.5)
EST. GFR  (AFRICAN AMERICAN): >60 ML/MIN/1.73 M^2
EST. GFR  (NON AFRICAN AMERICAN): >60 ML/MIN/1.73 M^2
GLUCOSE SERPL-MCNC: 88 MG/DL (ref 70–110)
HCT VFR BLD AUTO: 42.5 % (ref 37–48.5)
HDLC SERPL-MCNC: 35 MG/DL (ref 40–75)
HDLC SERPL: 22.2 % (ref 20–50)
HGB BLD-MCNC: 13.5 G/DL (ref 12–16)
LDLC SERPL CALC-MCNC: 107.4 MG/DL (ref 63–159)
MCH RBC QN AUTO: 32 PG (ref 27–31)
MCHC RBC AUTO-ENTMCNC: 31.8 G/DL (ref 32–36)
MCV RBC AUTO: 101 FL (ref 82–98)
NONHDLC SERPL-MCNC: 123 MG/DL
PLATELET # BLD AUTO: 271 K/UL (ref 150–450)
PMV BLD AUTO: 9.3 FL (ref 9.2–12.9)
POTASSIUM SERPL-SCNC: 4.2 MMOL/L (ref 3.5–5.1)
PROT SERPL-MCNC: 6.1 G/DL (ref 6–8.4)
RBC # BLD AUTO: 4.22 M/UL (ref 4–5.4)
SODIUM SERPL-SCNC: 142 MMOL/L (ref 136–145)
TRIGL SERPL-MCNC: 78 MG/DL (ref 30–150)
WBC # BLD AUTO: 5.14 K/UL (ref 3.9–12.7)

## 2021-04-13 PROCEDURE — 80053 COMPREHEN METABOLIC PANEL: CPT | Performed by: FAMILY MEDICINE

## 2021-04-13 PROCEDURE — 85027 COMPLETE CBC AUTOMATED: CPT | Performed by: FAMILY MEDICINE

## 2021-04-13 PROCEDURE — 36415 COLL VENOUS BLD VENIPUNCTURE: CPT | Mod: PO | Performed by: FAMILY MEDICINE

## 2021-04-13 PROCEDURE — 80061 LIPID PANEL: CPT | Performed by: FAMILY MEDICINE

## 2021-09-22 ENCOUNTER — IMMUNIZATION (OUTPATIENT)
Dept: PHARMACY | Facility: CLINIC | Age: 76
End: 2021-09-22
Payer: MEDICARE

## 2021-09-28 ENCOUNTER — IMMUNIZATION (OUTPATIENT)
Dept: FAMILY MEDICINE | Facility: CLINIC | Age: 76
End: 2021-09-28
Payer: MEDICARE

## 2021-09-28 DIAGNOSIS — Z23 NEED FOR VACCINATION: Primary | ICD-10-CM

## 2021-09-28 PROCEDURE — 91300 COVID-19, MRNA, LNP-S, PF, 30 MCG/0.3 ML DOSE VACCINE: CPT | Mod: PBBFAC | Performed by: FAMILY MEDICINE

## 2021-09-28 PROCEDURE — 0003A COVID-19, MRNA, LNP-S, PF, 30 MCG/0.3 ML DOSE VACCINE: CPT | Mod: PBBFAC | Performed by: FAMILY MEDICINE

## 2022-05-06 ENCOUNTER — LAB VISIT (OUTPATIENT)
Dept: LAB | Facility: HOSPITAL | Age: 77
End: 2022-05-06
Attending: FAMILY MEDICINE
Payer: MEDICARE

## 2022-05-06 DIAGNOSIS — Z00.00 ROUTINE GENERAL MEDICAL EXAMINATION AT A HEALTH CARE FACILITY: Primary | ICD-10-CM

## 2022-05-06 LAB
ALBUMIN SERPL BCP-MCNC: 4 G/DL (ref 3.5–5.2)
ALP SERPL-CCNC: 61 U/L (ref 55–135)
ALT SERPL W/O P-5'-P-CCNC: 21 U/L (ref 10–44)
ANION GAP SERPL CALC-SCNC: 10 MMOL/L (ref 8–16)
AST SERPL-CCNC: 25 U/L (ref 10–40)
BASOPHILS # BLD AUTO: 0.02 K/UL (ref 0–0.2)
BASOPHILS NFR BLD: 0.3 % (ref 0–1.9)
BILIRUB SERPL-MCNC: 0.7 MG/DL (ref 0.1–1)
BUN SERPL-MCNC: 16 MG/DL (ref 8–23)
CALCIUM SERPL-MCNC: 10.2 MG/DL (ref 8.7–10.5)
CHLORIDE SERPL-SCNC: 103 MMOL/L (ref 95–110)
CHOLEST SERPL-MCNC: 209 MG/DL (ref 120–199)
CHOLEST/HDLC SERPL: 3 {RATIO} (ref 2–5)
CO2 SERPL-SCNC: 26 MMOL/L (ref 23–29)
CREAT SERPL-MCNC: 0.9 MG/DL (ref 0.5–1.4)
DIFFERENTIAL METHOD: NORMAL
EOSINOPHIL # BLD AUTO: 0.1 K/UL (ref 0–0.5)
EOSINOPHIL NFR BLD: 1.3 % (ref 0–8)
ERYTHROCYTE [DISTWIDTH] IN BLOOD BY AUTOMATED COUNT: 13 % (ref 11.5–14.5)
EST. GFR  (AFRICAN AMERICAN): >60 ML/MIN/1.73 M^2
EST. GFR  (NON AFRICAN AMERICAN): >60 ML/MIN/1.73 M^2
GLUCOSE SERPL-MCNC: 93 MG/DL (ref 70–110)
HCT VFR BLD AUTO: 44.6 % (ref 37–48.5)
HDLC SERPL-MCNC: 69 MG/DL (ref 40–75)
HDLC SERPL: 33 % (ref 20–50)
HGB BLD-MCNC: 14.4 G/DL (ref 12–16)
IMM GRANULOCYTES # BLD AUTO: 0.01 K/UL (ref 0–0.04)
IMM GRANULOCYTES NFR BLD AUTO: 0.1 % (ref 0–0.5)
LDLC SERPL CALC-MCNC: 119.6 MG/DL (ref 63–159)
LYMPHOCYTES # BLD AUTO: 1.5 K/UL (ref 1–4.8)
LYMPHOCYTES NFR BLD: 22.3 % (ref 18–48)
MCH RBC QN AUTO: 31 PG (ref 27–31)
MCHC RBC AUTO-ENTMCNC: 32.3 G/DL (ref 32–36)
MCV RBC AUTO: 96 FL (ref 82–98)
MONOCYTES # BLD AUTO: 0.6 K/UL (ref 0.3–1)
MONOCYTES NFR BLD: 8.6 % (ref 4–15)
NEUTROPHILS # BLD AUTO: 4.7 K/UL (ref 1.8–7.7)
NEUTROPHILS NFR BLD: 67.4 % (ref 38–73)
NONHDLC SERPL-MCNC: 140 MG/DL
NRBC BLD-RTO: 0 /100 WBC
PLATELET # BLD AUTO: 195 K/UL (ref 150–450)
PMV BLD AUTO: 9.3 FL (ref 9.2–12.9)
POTASSIUM SERPL-SCNC: 4.7 MMOL/L (ref 3.5–5.1)
PROT SERPL-MCNC: 7.2 G/DL (ref 6–8.4)
RBC # BLD AUTO: 4.64 M/UL (ref 4–5.4)
SODIUM SERPL-SCNC: 139 MMOL/L (ref 136–145)
TRIGL SERPL-MCNC: 102 MG/DL (ref 30–150)
WBC # BLD AUTO: 6.9 K/UL (ref 3.9–12.7)

## 2022-05-06 PROCEDURE — 80061 LIPID PANEL: CPT | Performed by: FAMILY MEDICINE

## 2022-05-06 PROCEDURE — 85025 COMPLETE CBC W/AUTO DIFF WBC: CPT | Performed by: FAMILY MEDICINE

## 2022-05-06 PROCEDURE — 80053 COMPREHEN METABOLIC PANEL: CPT | Performed by: FAMILY MEDICINE

## 2022-05-06 PROCEDURE — 36415 COLL VENOUS BLD VENIPUNCTURE: CPT | Mod: PO | Performed by: FAMILY MEDICINE

## 2022-09-23 ENCOUNTER — IMMUNIZATION (OUTPATIENT)
Dept: FAMILY MEDICINE | Facility: CLINIC | Age: 77
End: 2022-09-23
Payer: MEDICARE

## 2022-09-23 DIAGNOSIS — Z23 NEED FOR VACCINATION: Primary | ICD-10-CM

## 2022-09-23 PROCEDURE — 91312 COVID-19, MRNA, LNP-S, BIVALENT BOOSTER, PF, 30 MCG/0.3 ML DOSE: ICD-10-PCS | Mod: S$GLB,,, | Performed by: FAMILY MEDICINE

## 2022-09-23 PROCEDURE — 0124A COVID-19, MRNA, LNP-S, BIVALENT BOOSTER, PF, 30 MCG/0.3 ML DOSE: CPT | Mod: PBBFAC | Performed by: FAMILY MEDICINE

## 2022-09-23 PROCEDURE — 91312 COVID-19, MRNA, LNP-S, BIVALENT BOOSTER, PF, 30 MCG/0.3 ML DOSE: CPT | Mod: S$GLB,,, | Performed by: FAMILY MEDICINE

## 2023-06-16 ENCOUNTER — LAB VISIT (OUTPATIENT)
Dept: LAB | Facility: HOSPITAL | Age: 78
End: 2023-06-16
Payer: MEDICARE

## 2023-06-16 DIAGNOSIS — I10 ESSENTIAL HYPERTENSION, MALIGNANT: Primary | ICD-10-CM

## 2023-06-16 DIAGNOSIS — I25.10 CORONARY ATHEROSCLEROSIS OF NATIVE CORONARY ARTERY: ICD-10-CM

## 2023-06-16 LAB
BASOPHILS # BLD AUTO: 0.02 K/UL (ref 0–0.2)
BASOPHILS NFR BLD: 0.4 % (ref 0–1.9)
DIFFERENTIAL METHOD: ABNORMAL
EOSINOPHIL # BLD AUTO: 0.1 K/UL (ref 0–0.5)
EOSINOPHIL NFR BLD: 1.9 % (ref 0–8)
ERYTHROCYTE [DISTWIDTH] IN BLOOD BY AUTOMATED COUNT: 12.9 % (ref 11.5–14.5)
HCT VFR BLD AUTO: 40.7 % (ref 37–48.5)
HGB BLD-MCNC: 13.5 G/DL (ref 12–16)
IMM GRANULOCYTES # BLD AUTO: 0.01 K/UL (ref 0–0.04)
IMM GRANULOCYTES NFR BLD AUTO: 0.2 % (ref 0–0.5)
LYMPHOCYTES # BLD AUTO: 1.3 K/UL (ref 1–4.8)
LYMPHOCYTES NFR BLD: 22.8 % (ref 18–48)
MCH RBC QN AUTO: 31.9 PG (ref 27–31)
MCHC RBC AUTO-ENTMCNC: 33.2 G/DL (ref 32–36)
MCV RBC AUTO: 96 FL (ref 82–98)
MONOCYTES # BLD AUTO: 0.5 K/UL (ref 0.3–1)
MONOCYTES NFR BLD: 9.2 % (ref 4–15)
NEUTROPHILS # BLD AUTO: 3.7 K/UL (ref 1.8–7.7)
NEUTROPHILS NFR BLD: 65.5 % (ref 38–73)
NRBC BLD-RTO: 0 /100 WBC
PLATELET # BLD AUTO: 209 K/UL (ref 150–450)
PMV BLD AUTO: 9.1 FL (ref 9.2–12.9)
RBC # BLD AUTO: 4.23 M/UL (ref 4–5.4)
WBC # BLD AUTO: 5.67 K/UL (ref 3.9–12.7)

## 2023-06-16 PROCEDURE — 80053 COMPREHEN METABOLIC PANEL: CPT | Performed by: FAMILY MEDICINE

## 2023-06-16 PROCEDURE — 36415 COLL VENOUS BLD VENIPUNCTURE: CPT | Mod: PO | Performed by: FAMILY MEDICINE

## 2023-06-16 PROCEDURE — 85025 COMPLETE CBC W/AUTO DIFF WBC: CPT | Performed by: FAMILY MEDICINE

## 2023-06-16 PROCEDURE — 80061 LIPID PANEL: CPT | Performed by: FAMILY MEDICINE

## 2023-06-17 LAB
ALBUMIN SERPL BCP-MCNC: 3.7 G/DL (ref 3.5–5.2)
ALP SERPL-CCNC: 72 U/L (ref 55–135)
ALT SERPL W/O P-5'-P-CCNC: 14 U/L (ref 10–44)
ANION GAP SERPL CALC-SCNC: 10 MMOL/L (ref 8–16)
AST SERPL-CCNC: 17 U/L (ref 10–40)
BILIRUB SERPL-MCNC: 0.6 MG/DL (ref 0.1–1)
BUN SERPL-MCNC: 21 MG/DL (ref 8–23)
CALCIUM SERPL-MCNC: 9.8 MG/DL (ref 8.7–10.5)
CHLORIDE SERPL-SCNC: 106 MMOL/L (ref 95–110)
CHOLEST SERPL-MCNC: 187 MG/DL (ref 120–199)
CHOLEST/HDLC SERPL: 3.1 {RATIO} (ref 2–5)
CO2 SERPL-SCNC: 26 MMOL/L (ref 23–29)
CREAT SERPL-MCNC: 0.9 MG/DL (ref 0.5–1.4)
EST. GFR  (NO RACE VARIABLE): >60 ML/MIN/1.73 M^2
GLUCOSE SERPL-MCNC: 85 MG/DL (ref 70–110)
HDLC SERPL-MCNC: 60 MG/DL (ref 40–75)
HDLC SERPL: 32.1 % (ref 20–50)
LDLC SERPL CALC-MCNC: 107 MG/DL (ref 63–159)
NONHDLC SERPL-MCNC: 127 MG/DL
POTASSIUM SERPL-SCNC: 4.4 MMOL/L (ref 3.5–5.1)
PROT SERPL-MCNC: 6.7 G/DL (ref 6–8.4)
SODIUM SERPL-SCNC: 142 MMOL/L (ref 136–145)
TRIGL SERPL-MCNC: 100 MG/DL (ref 30–150)

## (undated) DEVICE — SEE MEDLINE ITEM 146313

## (undated) DEVICE — SEE MEDLINE ITEM 146417

## (undated) DEVICE — PINS STERILE RIM SPEED 45MM
Type: IMPLANTABLE DEVICE | Site: KNEE | Status: NON-FUNCTIONAL
Removed: 2018-05-15

## (undated) DEVICE — SEE MEDLINE ITEM 157125

## (undated) DEVICE — STOCKINET TUBULAR 1 PLY 6X60IN

## (undated) DEVICE — SEE MEDLINE ITEM 154981

## (undated) DEVICE — SOL IRR NACL .9% 3000ML

## (undated) DEVICE — SEE MEDLINE ITEM 152622

## (undated) DEVICE — GLOVE BIOGEL ECLIPSE SZ 7.5

## (undated) DEVICE — Device

## (undated) DEVICE — PIN NON RIMMED SPEED 65MM
Type: IMPLANTABLE DEVICE | Site: KNEE | Status: NON-FUNCTIONAL
Removed: 2018-05-15

## (undated) DEVICE — DRESSING AQUACEL AG 3.5X10IN

## (undated) DEVICE — SEE MEDLINE ITEM 146231

## (undated) DEVICE — PAD CAST SPECIALIST STRL 6

## (undated) DEVICE — UNDERGLOVES BIOGEL PI SIZE 8

## (undated) DEVICE — SEE MEDLINE ITEM 146298

## (undated) DEVICE — UNDERGLOVES BIOGEL PI SIZE 8.5

## (undated) DEVICE — SUT 1 36IN COATED VICRYL UN

## (undated) DEVICE — SEE MEDLINE ITEM 152530

## (undated) DEVICE — STAPLER SKIN ROTATING HEAD

## (undated) DEVICE — BLADE SURG STAINLESS STEEL #10

## (undated) DEVICE — BANDAGE ESMARK 6X12

## (undated) DEVICE — BLADE SAW SGTL DL STR 25X1.27X

## (undated) DEVICE — BOWL CEMENT MIXING HIVAC 7

## (undated) DEVICE — SEE MEDLINE ITEM 157131

## (undated) DEVICE — KIT VISIONAIRE RT CUTTING

## (undated) DEVICE — TOURNIQUET SB QC DP 34X4IN

## (undated) DEVICE — SUT 2/0 36IN COATED VICRYL

## (undated) DEVICE — SPONGE LAP 18X18 PREWASHED

## (undated) DEVICE — DRAPE INCISE IOBAN 2 23X17IN

## (undated) DEVICE — PAD COLD THERAPY KNEE WRAP ON

## (undated) DEVICE — GOWN TOGA SYS PEELWY ZIP 2 XL

## (undated) DEVICE — INTERPULSE SET